# Patient Record
Sex: MALE | Race: ASIAN | NOT HISPANIC OR LATINO
[De-identification: names, ages, dates, MRNs, and addresses within clinical notes are randomized per-mention and may not be internally consistent; named-entity substitution may affect disease eponyms.]

---

## 2018-10-03 ENCOUNTER — APPOINTMENT (OUTPATIENT)
Dept: NEUROLOGY | Facility: CLINIC | Age: 63
End: 2018-10-03
Payer: COMMERCIAL

## 2018-10-03 VITALS
SYSTOLIC BLOOD PRESSURE: 128 MMHG | BODY MASS INDEX: 26.22 KG/M2 | WEIGHT: 173 LBS | HEART RATE: 88 BPM | HEIGHT: 68 IN | DIASTOLIC BLOOD PRESSURE: 66 MMHG

## 2018-10-03 DIAGNOSIS — Z78.9 OTHER SPECIFIED HEALTH STATUS: ICD-10-CM

## 2018-10-03 DIAGNOSIS — Z82.49 FAMILY HISTORY OF ISCHEMIC HEART DISEASE AND OTHER DISEASES OF THE CIRCULATORY SYSTEM: ICD-10-CM

## 2018-10-03 PROCEDURE — 99244 OFF/OP CNSLTJ NEW/EST MOD 40: CPT

## 2018-10-03 RX ORDER — AMOXICILLIN 500 MG/1
500 CAPSULE ORAL
Qty: 21 | Refills: 0 | Status: DISCONTINUED | COMMUNITY
Start: 2018-08-25

## 2018-10-03 RX ORDER — PREDNISONE 10 MG/1
10 TABLET ORAL
Qty: 50 | Refills: 0 | Status: DISCONTINUED | COMMUNITY
Start: 2018-04-24

## 2018-10-03 RX ORDER — VALACYCLOVIR 1 G/1
1 TABLET, FILM COATED ORAL
Qty: 21 | Refills: 0 | Status: DISCONTINUED | COMMUNITY
Start: 2018-08-20

## 2018-10-03 RX ORDER — ACYCLOVIR 200 MG/1
200 CAPSULE ORAL
Qty: 50 | Refills: 0 | Status: DISCONTINUED | COMMUNITY
Start: 2018-06-29

## 2018-11-02 ENCOUNTER — APPOINTMENT (OUTPATIENT)
Dept: NEUROLOGY | Facility: CLINIC | Age: 63
End: 2018-11-02
Payer: COMMERCIAL

## 2018-11-02 VITALS — HEART RATE: 103 BPM | WEIGHT: 173 LBS | BODY MASS INDEX: 26.22 KG/M2 | HEIGHT: 68 IN

## 2018-11-02 PROCEDURE — 99213 OFFICE O/P EST LOW 20 MIN: CPT

## 2018-11-06 ENCOUNTER — OTHER (OUTPATIENT)
Age: 63
End: 2018-11-06

## 2019-06-26 ENCOUNTER — APPOINTMENT (OUTPATIENT)
Dept: NEUROLOGY | Facility: CLINIC | Age: 64
End: 2019-06-26
Payer: COMMERCIAL

## 2019-06-26 VITALS
HEART RATE: 112 BPM | WEIGHT: 165 LBS | HEIGHT: 68 IN | SYSTOLIC BLOOD PRESSURE: 152 MMHG | BODY MASS INDEX: 25.01 KG/M2 | DIASTOLIC BLOOD PRESSURE: 80 MMHG

## 2019-06-26 PROCEDURE — 99215 OFFICE O/P EST HI 40 MIN: CPT

## 2019-06-26 RX ORDER — AMLODIPINE BESYLATE 5 MG/1
5 TABLET ORAL
Qty: 30 | Refills: 0 | Status: DISCONTINUED | COMMUNITY
Start: 2018-05-15 | End: 2019-06-26

## 2019-06-26 NOTE — HISTORY OF PRESENT ILLNESS
[FreeTextEntry1] : I last saw Mr. Lemus on 11/2/19.\par He is accompanied today by his son.\par He says that when he went to [place] in February he began started having pain across the lower abdomen.\par He saw a neurologist in [facility] who performed a nerve conduction study.\par He added carbamazepine 200 mg twice a day, lacosamide 50 mg twice a day, and Oxiva (L-methylfolate, methylcoabalamin, N-acetylcysteine). \par He had discontinued nortritpyline because of constipation but also continued on gabapentin.\par He came back to the [place] in the middle of April. The pain returned to the right and left chest. \par He also has pain on his back.\par He reports burning pain.\par He has pain with any contact including his comforter and his shirt.\par He does not have any significant neck pain.\par He is not aware of any infections, unusual exposures or tick bites.\par \par He has stopped the carbamazepine, lacosamide and Oxiva.\par He is taking gabapentin 300 mg BID and nortriptyline 25 mg qhs for the last 5-6 weeks.\par \par About ten days ago he developed weakness in the right foot. He cannot flex the foot.\par He has pain and weakness in the right lateral lower leg. \par He has no back pain.\par He has a burning sensation on the dorsum of the foot and in the first three toes..\par He denies having any loss of feeling in his feet.\par \par He also has burning in both feet.\par \par He denies having any bowel or bladder incontinence.

## 2019-06-26 NOTE — DATA REVIEWED
[de-identified] : MRI lumbar spine 8/16/18: multilevel DDD with herniation and stenoses\par MRI thoracic spine 8/16/18: mild stable thoracic degenerative disc disease.\par MRI cervical spine 8/16/18: multilevel degenerative spondylitic change with asymmetries and mild cord compression. \par \par blood tests 10/31/18: vitamin B12 616, RF negative, SPEP negative, Sjogren's Abs negative, HbA1C 11.4, vitamin B6 13.1k MILLY negative\par Blood tests 4/30/18: HbA1C 7.4, ESR 21, vitamin D 18.9\par \par EMG of all four extremities performed din [facility] on 3/21/19: predominantly sensory polyneuorpathy\par There were delayed sensory latencies, decreased amplitudes and decreased velocities in the nerves tested.

## 2019-06-26 NOTE — PHYSICAL EXAM
[FreeTextEntry1] : Examination:\par Constitutional: normal, no apparent distress\par Eyes: normal conjunctiva b/l, no ptosis, visual fields full\par Respiratory: no respiratory distress, normal effort, normal auscultation\par Cardiovascular: normal rate, rhythm, no murmurs\par Neck: supple, no masses\par Vascular: carotids normal\par Skin: normal color, no rashes\par Psych: normal mood, affect\par \par Neurological:\par Memory: normal memory, oriented to person, place, time\par Language intact/no aphasia\par Cranial Nerves: II-XII intact, Pupils equally round and reactive to light, ocular muscles/movements intact, no ptosis, no facial weakness, tongue protrudes normally in the midline, \par Motor: normal tone, no pronator drift, full strength in bilateral upper extremities and left lower extremity.\par 0/5 in right dorsiflexion, otherwise intact\par Coordination: Fine motor movements intact, rapid alternating movements intact, finger to nose intact bilaterally\par Sensory: intact to light touch, vibration, joint position\par DTRs: diffusely hypoactive\par Gait: right steppage gait\par

## 2019-06-26 NOTE — CONSULT LETTER
[Dear  ___] : Dear  [unfilled], [Consult Letter:] : I had the pleasure of evaluating your patient, [unfilled]. [Please see my note below.] : Please see my note below. [FreeTextEntry2] : Keily Noguera [Consult Closing:] : Thank you very much for allowing me to participate in the care of this patient.  If you have any questions, please do not hesitate to contact me. [FreeTextEntry3] : Sincerely,\par \par \par Siomara Monroe MD\par Diplomate, American Academy of Psychiatry and Neurology\par Board Certified in the Subspecialty of Clinical Neurophysiology\par Board Certified in the Subspecialty of Sleep Medicine\par Board Certified in the Subspecialty of Epilepsy\par

## 2019-07-17 ENCOUNTER — OTHER (OUTPATIENT)
Age: 64
End: 2019-07-17

## 2019-07-26 ENCOUNTER — OTHER (OUTPATIENT)
Age: 64
End: 2019-07-26

## 2019-08-01 ENCOUNTER — APPOINTMENT (OUTPATIENT)
Dept: NEUROLOGY | Facility: CLINIC | Age: 64
End: 2019-08-01
Payer: MEDICARE

## 2019-08-01 PROCEDURE — 95886 MUSC TEST DONE W/N TEST COMP: CPT

## 2019-08-01 PROCEDURE — 95911 NRV CNDJ TEST 9-10 STUDIES: CPT

## 2019-08-01 NOTE — PROCEDURE
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

## 2019-08-01 NOTE — DATA REVIEWED
[de-identified] : MRI lumbar spine 8/16/18: multilevel DDD with herniation and stenoses\par MRI thoracic spine 8/16/18: mild stable thoracic degenerative disc disease.\par MRI cervical spine 8/16/18: multilevel degenerative spondylitic change with asymmetries and mild cord compression. \par Blood tests 10/31/18: vitamin B12 616, RF negative, SPEP negative, Sjogren's Abs negative, HbA1C 11.4, vitamin B6 13.1k MILLY negative\par Blood tests 4/30/18: HbA1C 7.4, ESR 21, vitamin D 18.9\par EMG/NCV study report performed in [facility] on 3/21/19: C/W predominantly sensory polyneuropathy.\par

## 2019-08-01 NOTE — REASON FOR VISIT
[Procedure: _________] : a [unfilled] procedure visit [FreeTextEntry1] : referred by Dr. Kim for EMG/MCV studies of lower extremities evaluation of neuropathy/radiculopathy

## 2019-08-05 ENCOUNTER — APPOINTMENT (OUTPATIENT)
Dept: NEUROLOGY | Facility: CLINIC | Age: 64
End: 2019-08-05
Payer: MEDICARE

## 2019-08-05 VITALS
DIASTOLIC BLOOD PRESSURE: 80 MMHG | SYSTOLIC BLOOD PRESSURE: 140 MMHG | BODY MASS INDEX: 25.76 KG/M2 | HEART RATE: 109 BPM | HEIGHT: 68 IN | WEIGHT: 170 LBS

## 2019-08-05 PROCEDURE — 99213 OFFICE O/P EST LOW 20 MIN: CPT

## 2019-08-05 NOTE — PHYSICAL EXAM
[FreeTextEntry1] : Examination:\par Constitutional: normal, no apparent distress\par Eyes: normal conjunctiva b/l, no ptosis, visual fields full\par Respiratory: no respiratory distress, normal effort, normal auscultation\par Cardiovascular: normal rate, rhythm, no murmurs\par Neck: supple, no masses\par Vascular: carotids normal\par Skin: normal color, no rashes\par Psych: normal mood, affect\par \par Neurological:\par Memory: normal memory, oriented to person, place, time\par Language intact/no aphasia\par Cranial Nerves: II-XII intact, Pupils equally round and reactive to light, ocular muscles/movements intact, no ptosis, no facial weakness, tongue protrudes normally in the midline, \par Motor: normal tone, no pronator drift, full strength in bilateral upper extremities and left lower extremity.\par 0/5 in right dorsiflexion, otherwise intact\par Coordination: Fine motor movements intact, rapid alternating movements intact, finger to nose intact bilaterally\par Sensory: intact to light touch, vibration, joint position\par DTRs: diffusely hypoactive\par Gait: right steppage gait

## 2019-08-05 NOTE — DISCUSSION/SUMMARY
[FreeTextEntry1] : Mr. Lemus is a 63 year old who has had several months of burning pain on the trunk. this started on the right and is now present on the left as well.\par His pain is improved overall but he is having discomfort over his chest at night.\par At this time his symptoms do seem most suggestive of a small fiber neuropathy.\par \par \par The pain has now worsened in terms of distribution and he has also developed a right foot drop. \par His exam is significant for diffusely hypoactive reflexes and a 0/5 strength in right dorsiflexion.\par \par EMG confirms the presence of sensorimotor polyneuropathy, primarily of the axonal type and right peroneal neuropathy.\par -Start physical therapy. If needed we can get an AFO made.\par -We discussed the importance of blood sugar control as diabetes is the likely cause of his neuropathy. Tick borne panel and paraneoplastic panel negative\par -Painful small fiber neuropathy - Continue gabapentin 300 mg four times per day while pending approval for Lyrica 100 mg BID and then TID. We discussed the risk of mood disturbance with these medications. \par \par He will call in 2-3 weeks.\par F/U in office in 2-3 months, sooner if needed.

## 2019-08-05 NOTE — DATA REVIEWED
[de-identified] : MRI lumbar spine 8/16/18: multilevel DDD with herniation and stenoses\par MRI thoracic spine 8/16/18: mild stable thoracic degenerative disc disease.\par MRI cervical spine 8/16/18: multilevel degenerative spondylitic change with asymmetries and mild cord compression. \par \par blood tests 10/31/18: vitamin B12 616, RF negative, SPEP negative, Sjogren's Abs negative, HbA1C 11.4, vitamin B6 13.1k MILLY negative\par Blood tests 4/30/18: HbA1C 7.4, ESR 21, vitamin D 18.9\par \par EMG of all four extremities performed din [facility] on 3/21/19: predominantly sensory polyneuorpathy\par There were delayed sensory latencies, decreased amplitudes and decreased velocities in the nerves tested. \par \par MRI brain non contrast 7/15/19: unremarakable\par \par MRI lumbar spine 7/15/19: stable mutilevel DDD with herniation and stenosis. \par \par Blood tests 7/25/19: paraneoplastic Abs negative. Tick panel negative\par EMG 8/1/19:\par Right peroneal neuropathy primarily with axonal loss, in addition, there is evidence of mild to moderate peripheral sensorimotor polyneuropathy, axonal type. \par

## 2019-08-05 NOTE — HISTORY OF PRESENT ILLNESS
[FreeTextEntry1] : I last saw Mr. Lemus on 6/26/19.\par He continues to have significant pain around his chest. \par He has difficulty even wearing a shirt. \par \par He increased the gabapentin to 300 mg four times per day. \par He is not experiencing any side effects with the increased dose.\par \par He does not check his blood sugar routinely because he does not like to stick himself with a needle. \par \par He has an appointment to start physical therapy for his foot drop this week. \par He tripped once in the gym.

## 2019-10-02 ENCOUNTER — APPOINTMENT (OUTPATIENT)
Dept: NEUROLOGY | Facility: CLINIC | Age: 64
End: 2019-10-02
Payer: MEDICARE

## 2019-10-02 ENCOUNTER — RX RENEWAL (OUTPATIENT)
Age: 64
End: 2019-10-02

## 2019-10-02 VITALS
BODY MASS INDEX: 25.01 KG/M2 | SYSTOLIC BLOOD PRESSURE: 118 MMHG | HEIGHT: 68 IN | DIASTOLIC BLOOD PRESSURE: 77 MMHG | WEIGHT: 165 LBS | HEART RATE: 91 BPM

## 2019-10-02 DIAGNOSIS — M79.2 NEURALGIA AND NEURITIS, UNSPECIFIED: ICD-10-CM

## 2019-10-02 DIAGNOSIS — M21.371 FOOT DROP, RIGHT FOOT: ICD-10-CM

## 2019-10-02 PROCEDURE — 99213 OFFICE O/P EST LOW 20 MIN: CPT

## 2019-10-02 NOTE — DATA REVIEWED
[de-identified] : MRI lumbar spine 8/16/18: multilevel DDD with herniation and stenoses\par MRI thoracic spine 8/16/18: mild stable thoracic degenerative disc disease.\par MRI cervical spine 8/16/18: multilevel degenerative spondylitic change with asymmetries and mild cord compression. \par \par blood tests 10/31/18: vitamin B12 616, RF negative, SPEP negative, Sjogren's Abs negative, HbA1C 11.4, vitamin B6 13.1k MILLY negative\par Blood tests 4/30/18: HbA1C 7.4, ESR 21, vitamin D 18.9\par \par EMG of all four extremities performed din [facility] on 3/21/19: predominantly sensory polyneuorpathy\par There were delayed sensory latencies, decreased amplitudes and decreased velocities in the nerves tested. \par \par MRI brain non contrast 7/15/19: unremarakable\par \par MRI lumbar spine 7/15/19: stable mutilevel DDD with herniation and stenosis. \par \par Blood tests 7/25/19: paraneoplastic Abs negative. Tick panel negative\par EMG 8/1/19:\par Right peroneal neuropathy primarily with axonal loss, in addition, there is evidence of mild to moderate peripheral sensorimotor polyneuropathy, axonal type. \par

## 2019-10-02 NOTE — HISTORY OF PRESENT ILLNESS
[FreeTextEntry1] : Mr. Lemus was last seen on 8/5/19.\par He did not see any improvement with 200 mg/day of Lyrica. He increased the dose to 300 mg and still does not have significant improvement. He is not aware of any significant side effects including weight gain, sleepiness or mood disturbance.\par He continues to have significant pain described as burning over his chest and back.\par Even the touch of his shirt on his skin irritates him.\par He is able to sleep for about 3-4 hours at a time which is an improvement.\par \par He stopped nortriptyline because of constipation but he thinks it may have helped some of his symptoms in the past.\par \par He has been recording his blood sugar levels and they are improved overall.\par \par He stopped physical therapy for foot drop because he cannot keep his shirt on for a whole hour.\par He walks with a steppage gait. He is not tripping/falling; he is very careful. He has not gotten an AFO.

## 2019-10-02 NOTE — PHYSICAL EXAM
[FreeTextEntry1] : Examination:\par Constitutional: normal, no apparent distress\par Eyes: normal conjunctiva b/l, no ptosis, visual fields full\par Respiratory: no respiratory distress, normal effort, normal auscultation\par Cardiovascular: normal rate, rhythm, no murmurs\par Neck: supple, no masses\par Vascular: carotids normal\par Skin: normal color, no rashes\par Psych: normal mood, affect\par \par Neurological:\par Memory: normal memory, oriented to person, place, time\par Language intact/no aphasia\par Cranial Nerves: II-XII intact, Pupils equally round and reactive to light, ocular muscles/movements intact, no ptosis, no facial weakness, tongue protrudes normally in the midline, \par Motor: normal tone, no pronator drift, full strength in bilateral upper extremities and left lower extremity.\par 0/5 in right dorsiflexion, otherwise intact\par Coordination: Fine motor movements intact, rapid alternating movements intact, finger to nose intact bilaterally\par Sensory: intact to light touch, vibration, joint position\par DTRs: diffusely hypoactive\par Gait: right steppage gait. \par

## 2019-10-02 NOTE — CONSULT LETTER
[Dear  ___] : Dear  [unfilled], [Consult Letter:] : I had the pleasure of evaluating your patient, [unfilled]. [Please see my note below.] : Please see my note below. [Consult Closing:] : Thank you very much for allowing me to participate in the care of this patient.  If you have any questions, please do not hesitate to contact me. [FreeTextEntry2] : Keily Noguera [FreeTextEntry3] : Sincerely,\par \par \par Siomara Monroe MD\par Diplomate, American Academy of Psychiatry and Neurology\par Board Certified in the Subspecialty of Clinical Neurophysiology\par Board Certified in the Subspecialty of Sleep Medicine\par Board Certified in the Subspecialty of Epilepsy\par

## 2019-10-02 NOTE — DISCUSSION/SUMMARY
[FreeTextEntry1] : Mr. Lemus is a 63 year old who has had several months of burning pain on the trunk. this started on the right and is now present on the left as well.\par He continues to have significant pain, most apparent over his left chest.\par At this time his symptoms do seem most suggestive of a small fiber neuropathy.\par \par \par The pain has now worsened in terms of distribution and he has also developed a right foot drop. \par His exam is significant for diffusely hypoactive reflexes and a 0/5 strength in right dorsiflexion.\par \par EMG confirms the presence of sensorimotor polyneuropathy, primarily of the axonal type and right peroneal neuropathy.\par -Continue home exercises since he is unable to tolerate PT in the office.\par -We discussed the importance of blood sugar control as diabetes is the likely cause of his neuropathy. Continue good glucose control. Tick borne panel and paraneoplastic panel negative. Can consider checking anti-Mag antibodies in the future which were not included.\par -Painful small fiber neuropathy - Continue Lyrica. Increase dose to 100-100-200 for total of 400 mg/day. Side effects discussed.\par Restart nortriptyline 25 mg qhs with aggressive bowel regimen - increased H2O intake, fruits, vegetables and Miralax if needed.\par \par \par \par He will call in 2-3 weeks.\par F/U in office in 2-3 months, sooner if needed. \par \par

## 2020-01-31 ENCOUNTER — RX RENEWAL (OUTPATIENT)
Age: 65
End: 2020-01-31

## 2020-08-20 ENCOUNTER — APPOINTMENT (OUTPATIENT)
Dept: NEUROLOGY | Facility: CLINIC | Age: 65
End: 2020-08-20
Payer: MEDICARE

## 2020-08-20 VITALS
BODY MASS INDEX: 23.79 KG/M2 | DIASTOLIC BLOOD PRESSURE: 74 MMHG | HEART RATE: 82 BPM | WEIGHT: 157 LBS | HEIGHT: 68 IN | SYSTOLIC BLOOD PRESSURE: 124 MMHG | TEMPERATURE: 98 F

## 2020-08-20 DIAGNOSIS — R20.2 PARESTHESIA OF SKIN: ICD-10-CM

## 2020-08-20 PROCEDURE — 99213 OFFICE O/P EST LOW 20 MIN: CPT

## 2020-08-20 RX ORDER — NORTRIPTYLINE HYDROCHLORIDE 25 MG/1
25 CAPSULE ORAL
Qty: 30 | Refills: 0 | Status: DISCONTINUED | COMMUNITY
Start: 2018-10-03 | End: 2020-08-20

## 2020-08-20 RX ORDER — PREGABALIN 75 MG/1
75 CAPSULE ORAL
Qty: 90 | Refills: 1 | Status: COMPLETED | COMMUNITY
Start: 2019-07-17 | End: 2020-08-20

## 2020-08-20 RX ORDER — GABAPENTIN 300 MG/1
300 CAPSULE ORAL
Qty: 90 | Refills: 3 | Status: DISCONTINUED | COMMUNITY
Start: 2018-07-23 | End: 2020-08-20

## 2020-08-20 RX ORDER — PREGABALIN 100 MG/1
100 CAPSULE ORAL
Qty: 120 | Refills: 2 | Status: COMPLETED | COMMUNITY
Start: 2019-08-05 | End: 2020-08-20

## 2020-08-20 RX ORDER — LIDOCAINE 5% 700 MG/1
5 PATCH TOPICAL
Qty: 30 | Refills: 2 | Status: COMPLETED | COMMUNITY
Start: 2018-10-03 | End: 2020-08-20

## 2020-08-20 RX ORDER — DILTIAZEM HYDROCHLORIDE 240 MG/1
240 TABLET, EXTENDED RELEASE ORAL DAILY
Refills: 0 | Status: ACTIVE | COMMUNITY
Start: 2020-08-20

## 2020-08-20 NOTE — DATA REVIEWED
[de-identified] : MRI lumbar spine 8/16/18: multilevel DDD with herniation and stenoses\par MRI thoracic spine 8/16/18: mild stable thoracic degenerative disc disease.\par MRI cervical spine 8/16/18: multilevel degenerative spondylitic change with asymmetries and mild cord compression. \par \par blood tests 10/31/18: vitamin B12 616, RF negative, SPEP negative, Sjogren's Abs negative, HbA1C 11.4, vitamin B6 13.1k MILLY negative\par Blood tests 4/30/18: HbA1C 7.4, ESR 21, vitamin D 18.9\par \par EMG of all four extremities performed din [facility] on 3/21/19: predominantly sensory polyneuorpathy\par There were delayed sensory latencies, decreased amplitudes and decreased velocities in the nerves tested. \par \par MRI brain non contrast 7/15/19: unremarakable\par \par MRI lumbar spine 7/15/19: stable mutilevel DDD with herniation and stenosis. \par \par Blood tests 7/25/19: paraneoplastic Abs negative. Tick panel negative\par EMG 8/1/19:\par Right peroneal neuropathy primarily with axonal loss, in addition, there is evidence of mild to moderate peripheral sensorimotor polyneuropathy, axonal type. \par

## 2020-08-20 NOTE — PHYSICAL EXAM
[FreeTextEntry1] : \par Examination:\par Constitutional: normal, no apparent distress\par Eyes: normal conjunctiva b/l, no ptosis, visual fields full\par Respiratory: no respiratory distress, normal effort, normal auscultation\par Cardiovascular: normal rate, rhythm, no murmurs\par Neck: supple, no masses\par Vascular: carotids normal\par Skin: normal color, no rashes\par extremities: good pedal pulses, feet warm to touch\par Psych: normal mood, affect\par \par Neurological:\par Memory: normal memory, oriented to person, place, time\par Language intact/no aphasia\par Cranial Nerves: II-XII intact, Pupils equally round and reactive to light, ocular muscles/movements intact, no ptosis, no facial weakness, tongue protrudes normally in the midline, \par Motor: normal tone, no pronator drift, full strength in bilateral upper extremities and lower extremities\par Coordination: Fine motor movements intact, rapid alternating movements intact, finger to nose intact bilaterally\par Sensory: intact to light touch, vibration, joint position\par DTRs: diffusely hypoactive\par Gait: narrow based, steady

## 2020-08-20 NOTE — HISTORY OF PRESENT ILLNESS
[FreeTextEntry1] : I last saw Mr. Lemus on 10/2/19.\par \par His right foot drop is much improved.\par He is not having difficulty walking.\par He continues to have some loss of sensation over his right toes and medial surface of the foot.\par \par The pain over his trunk is much improved.\par He says that it is about 90% better.\par However, he has some occasional pain over his chest. He says that the pain is different than his previous pain.\par \par He stopped taking nortriptyline about two weeks ago.\par He also stopped Lyrica. He felt that it was slowing him down.\par \par About four months ago he started to walk about two miles per day.\par He has noticed tingling in both feet and in his legs while walking.\par The tingling radiates up his legs. It is alleviated with rest.\par He does have pain in his lower back and he gets numbness in his buttocks.\par He denies any color or temperature change in his feet although they burn at night.\par \par He says that his sugars are fairly well controlled on metformin.\par \par Medications previously helped with pain but not with tingling.

## 2020-08-20 NOTE — DISCUSSION/SUMMARY
[FreeTextEntry1] : Mr. Lemus is a 65 year old man with a history of   burning pain on the trunk suggestive of a small fiber neuropathy.\par He also has a history of a right foot drop secondary to a peroneal neuropathy which has essentially resolved.\par At this time he is complaining of paresthesias in his lower extremities that are painful when walking. \par \par EMG has confirmed  the presence of sensorimotor polyneuropathy, primarily of the axonal type and right peroneal neuropathy.\par \par Current symptoms are likely related to peripheral neuropathy although there may also be an element of neurogenic claudication from spinal stenosis.\par Previous medications have not helped with these symptoms.\par -Trial of duloxetine 30 mg/day - can increase as needed\par -Referral to podiatry to see if show inserts would help with symptoms.\par -Continue good control of diabetes\par \par -F/U with primary care regarding chest discomfort and possible need for cardiac workup\par -If no improvement in lower extremity symptoms with above, can investigate for vascular etiology and consider repeat MRI lumbar spine.\par \par f/u 2-3 months, sooner if needed.\par

## 2020-11-19 ENCOUNTER — APPOINTMENT (OUTPATIENT)
Dept: NEUROLOGY | Facility: CLINIC | Age: 65
End: 2020-11-19
Payer: MEDICARE

## 2020-11-19 VITALS
HEIGHT: 68 IN | HEART RATE: 80 BPM | DIASTOLIC BLOOD PRESSURE: 86 MMHG | WEIGHT: 160 LBS | SYSTOLIC BLOOD PRESSURE: 142 MMHG | TEMPERATURE: 97.5 F | BODY MASS INDEX: 24.25 KG/M2

## 2020-11-19 DIAGNOSIS — G62.9 POLYNEUROPATHY, UNSPECIFIED: ICD-10-CM

## 2020-11-19 DIAGNOSIS — E11.42 TYPE 2 DIABETES MELLITUS WITH DIABETIC POLYNEUROPATHY: ICD-10-CM

## 2020-11-19 DIAGNOSIS — G57.31 LESION OF LATERAL POPLITEAL NERVE, RIGHT LOWER LIMB: ICD-10-CM

## 2020-11-19 PROCEDURE — 99213 OFFICE O/P EST LOW 20 MIN: CPT

## 2020-11-19 RX ORDER — DULOXETINE HYDROCHLORIDE 30 MG/1
30 CAPSULE, DELAYED RELEASE PELLETS ORAL DAILY
Qty: 30 | Refills: 3 | Status: DISCONTINUED | COMMUNITY
Start: 2020-08-20 | End: 2020-11-19

## 2020-11-19 NOTE — DISCUSSION/SUMMARY
[FreeTextEntry1] : Mr. Lemus is a 65 year old man with a history of burning pain on the trunk suggestive of a small fiber neuropathy.\par He also has a history of a right foot drop secondary to a peroneal neuropathy which has mostly resolved.\par At this time he is complaining of paresthesias in his lower extremities that are painful when walking. \par \par EMG has confirmed the presence of sensorimotor polyneuropathy, primarily of the axonal type and right peroneal neuropathy.\par \par Current symptoms are likely related to peripheral neuropathy as well as radiculopathy (last MRI l-spine July 2019)\par He denies back pain.\par -He is not interested in physical therapy at this time.\par Previous medications have not helped with these symptoms.\par -Previous trials include nortriptyline, gabapentin, Lyrica, Cymbalta.\par -Retrial of gabapentin. Will start 300 mg qhs x 2 weeks and then increase to 300 mg BID.\par -Continue good control of diabetes\par \par Diarrhea\par -Recommend f/u with PCP for physical and if needed referral to GI.\par \par f/u 3-4 months, sooner if needed.\par

## 2020-11-19 NOTE — DATA REVIEWED
[de-identified] : MRI lumbar spine 8/16/18: multilevel DDD with herniation and stenoses\par MRI thoracic spine 8/16/18: mild stable thoracic degenerative disc disease.\par MRI cervical spine 8/16/18: multilevel degenerative spondylitic change with asymmetries and mild cord compression. \par \par blood tests 10/31/18: vitamin B12 616, RF negative, SPEP negative, Sjogren's Abs negative, HbA1C 11.4, vitamin B6 13.1k MILLY negative\par Blood tests 4/30/18: HbA1C 7.4, ESR 21, vitamin D 18.9\par \par EMG of all four extremities performed din [facility] on 3/21/19: predominantly sensory polyneuorpathy\par There were delayed sensory latencies, decreased amplitudes and decreased velocities in the nerves tested. \par \par MRI brain non contrast 7/15/19: unremarakable\par \par MRI lumbar spine 7/15/19: stable mutilevel DDD with herniation and stenosis. \par \par Blood tests 7/25/19: paraneoplastic Abs negative. Tick panel negative\par EMG 8/1/19:\par Right peroneal neuropathy primarily with axonal loss, in addition, there is evidence of mild to moderate peripheral sensorimotor polyneuropathy, axonal type. \par

## 2020-11-19 NOTE — PHYSICAL EXAM
[FreeTextEntry1] : \par Examination:\par Constitutional: normal, no apparent distress\par Eyes: normal conjunctiva b/l, no ptosis, visual fields full\par Respiratory: no respiratory distress, normal effort, normal auscultation\par Cardiovascular: normal rate, rhythm, no murmurs\par Neck: supple, no masses\par Vascular: carotids normal\par Skin: normal color, no rashes\par extremities: good pedal pulses, feet warm to touch\par Psych: normal mood, affect\par \par Neurological:\par Memory: normal memory, oriented to person, place, time\par Language intact/no aphasia\par Cranial Nerves: II-XII intact, Pupils equally round and reactive to light, ocular muscles/movements intact, no ptosis, no facial weakness, tongue protrudes normally in the midline, \par Motor: normal tone, no pronator drift, full strength in bilateral upper extremities and lower extremities\par Coordination: Fine motor movements intact, rapid alternating movements intact, finger to nose intact bilaterally\par Sensory: intact to light touch, vibration, joint position\par DTRs: diffusely hypoactive\par Gait: narrow based, steady. \par

## 2020-11-19 NOTE — HISTORY OF PRESENT ILLNESS
[FreeTextEntry1] : I last saw Mr. Lemus on 8/20/20.\par He says that he is more or less the same.\par Over the last 6-8 weeks he has developed GI upset.\par He says that ~ 3-4 times per week he has a solid bowel movement in the AM and diarrhea in the evening.\par He has been watching his diet. He usually eats at 10 AM and 7 PM.\par He denies blood in the stool. Sometimes he notices mucous in his stool.\par He had a colonoscopy ~ 4 years ago.\par He has not had any recent travel.\par \par He only took duloxetine for a few days. He felt lethargic and strange and he discontinued it.\par \par He has paresthesias on the toes.\par He has numbness in his left thigh when walking.\par He denies swelling or temperature changes in his feet.\par He did not see podiatry.\par \par His foot drop is much improved.\par The pain in his trunk has resolved.\par

## 2021-03-16 ENCOUNTER — APPOINTMENT (OUTPATIENT)
Dept: NEUROLOGY | Facility: CLINIC | Age: 66
End: 2021-03-16

## 2021-11-28 ENCOUNTER — TRANSCRIPTION ENCOUNTER (OUTPATIENT)
Age: 66
End: 2021-11-28

## 2021-11-28 ENCOUNTER — INPATIENT (INPATIENT)
Facility: HOSPITAL | Age: 66
LOS: 1 days | Discharge: HOME | DRG: 660 | End: 2021-11-30
Attending: FAMILY MEDICINE | Admitting: STUDENT IN AN ORGANIZED HEALTH CARE EDUCATION/TRAINING PROGRAM
Payer: MEDICARE

## 2021-11-28 ENCOUNTER — RESULT REVIEW (OUTPATIENT)
Age: 66
End: 2021-11-28

## 2021-11-28 VITALS — WEIGHT: 160.06 LBS | HEIGHT: 68 IN

## 2021-11-28 DIAGNOSIS — N20.0 CALCULUS OF KIDNEY: ICD-10-CM

## 2021-11-28 LAB
ABO RH CONFIRMATION: SIGNIFICANT CHANGE UP
ALBUMIN SERPL ELPH-MCNC: 3.7 G/DL — SIGNIFICANT CHANGE UP (ref 3.3–5)
ALP SERPL-CCNC: 99 U/L — SIGNIFICANT CHANGE UP (ref 40–120)
ALT FLD-CCNC: 25 U/L — SIGNIFICANT CHANGE UP (ref 12–78)
ANION GAP SERPL CALC-SCNC: 9 MMOL/L — SIGNIFICANT CHANGE UP (ref 5–17)
APPEARANCE UR: CLEAR — SIGNIFICANT CHANGE UP
AST SERPL-CCNC: 14 U/L — LOW (ref 15–37)
BASOPHILS # BLD AUTO: 0.02 K/UL — SIGNIFICANT CHANGE UP (ref 0–0.2)
BASOPHILS NFR BLD AUTO: 0.1 % — SIGNIFICANT CHANGE UP (ref 0–2)
BILIRUB SERPL-MCNC: 0.3 MG/DL — SIGNIFICANT CHANGE UP (ref 0.2–1.2)
BILIRUB UR-MCNC: NEGATIVE — SIGNIFICANT CHANGE UP
BUN SERPL-MCNC: 16 MG/DL — SIGNIFICANT CHANGE UP (ref 7–23)
CALCIUM SERPL-MCNC: 9.6 MG/DL — SIGNIFICANT CHANGE UP (ref 8.5–10.1)
CHLORIDE SERPL-SCNC: 103 MMOL/L — SIGNIFICANT CHANGE UP (ref 96–108)
CO2 SERPL-SCNC: 25 MMOL/L — SIGNIFICANT CHANGE UP (ref 22–31)
COLOR SPEC: YELLOW — SIGNIFICANT CHANGE UP
CREAT SERPL-MCNC: 1.04 MG/DL — SIGNIFICANT CHANGE UP (ref 0.5–1.3)
DIFF PNL FLD: ABNORMAL
EOSINOPHIL # BLD AUTO: 0 K/UL — SIGNIFICANT CHANGE UP (ref 0–0.5)
EOSINOPHIL NFR BLD AUTO: 0 % — SIGNIFICANT CHANGE UP (ref 0–6)
GLUCOSE SERPL-MCNC: 159 MG/DL — HIGH (ref 70–99)
GLUCOSE UR QL: 50 MG/DL
HCT VFR BLD CALC: 41.9 % — SIGNIFICANT CHANGE UP (ref 39–50)
HGB BLD-MCNC: 13.8 G/DL — SIGNIFICANT CHANGE UP (ref 13–17)
IMM GRANULOCYTES NFR BLD AUTO: 0.9 % — SIGNIFICANT CHANGE UP (ref 0–1.5)
KETONES UR-MCNC: ABNORMAL
LACTATE SERPL-SCNC: 3.1 MMOL/L — HIGH (ref 0.7–2)
LACTATE SERPL-SCNC: 3.3 MMOL/L — HIGH (ref 0.7–2)
LEUKOCYTE ESTERASE UR-ACNC: ABNORMAL
LYMPHOCYTES # BLD AUTO: 1.65 K/UL — SIGNIFICANT CHANGE UP (ref 1–3.3)
LYMPHOCYTES # BLD AUTO: 9.9 % — LOW (ref 13–44)
MCHC RBC-ENTMCNC: 28.6 PG — SIGNIFICANT CHANGE UP (ref 27–34)
MCHC RBC-ENTMCNC: 32.9 GM/DL — SIGNIFICANT CHANGE UP (ref 32–36)
MCV RBC AUTO: 86.7 FL — SIGNIFICANT CHANGE UP (ref 80–100)
MONOCYTES # BLD AUTO: 0.57 K/UL — SIGNIFICANT CHANGE UP (ref 0–0.9)
MONOCYTES NFR BLD AUTO: 3.4 % — SIGNIFICANT CHANGE UP (ref 2–14)
NEUTROPHILS # BLD AUTO: 14.32 K/UL — HIGH (ref 1.8–7.4)
NEUTROPHILS NFR BLD AUTO: 85.7 % — HIGH (ref 43–77)
NITRITE UR-MCNC: NEGATIVE — SIGNIFICANT CHANGE UP
PH UR: 5 — SIGNIFICANT CHANGE UP (ref 5–8)
PLATELET # BLD AUTO: 337 K/UL — SIGNIFICANT CHANGE UP (ref 150–400)
POTASSIUM SERPL-MCNC: 4 MMOL/L — SIGNIFICANT CHANGE UP (ref 3.5–5.3)
POTASSIUM SERPL-SCNC: 4 MMOL/L — SIGNIFICANT CHANGE UP (ref 3.5–5.3)
PROT SERPL-MCNC: 8.5 GM/DL — HIGH (ref 6–8.3)
PROT UR-MCNC: 100 MG/DL
RAPID RVP RESULT: SIGNIFICANT CHANGE UP
RBC # BLD: 4.83 M/UL — SIGNIFICANT CHANGE UP (ref 4.2–5.8)
RBC # FLD: 14.2 % — SIGNIFICANT CHANGE UP (ref 10.3–14.5)
SARS-COV-2 RNA SPEC QL NAA+PROBE: SIGNIFICANT CHANGE UP
SODIUM SERPL-SCNC: 137 MMOL/L — SIGNIFICANT CHANGE UP (ref 135–145)
SP GR SPEC: 1.02 — SIGNIFICANT CHANGE UP (ref 1.01–1.02)
UROBILINOGEN FLD QL: NEGATIVE MG/DL — SIGNIFICANT CHANGE UP
WBC # BLD: 16.71 K/UL — HIGH (ref 3.8–10.5)
WBC # FLD AUTO: 16.71 K/UL — HIGH (ref 3.8–10.5)

## 2021-11-28 PROCEDURE — 88300 SURGICAL PATH GROSS: CPT

## 2021-11-28 PROCEDURE — 80048 BASIC METABOLIC PNL TOTAL CA: CPT

## 2021-11-28 PROCEDURE — 83036 HEMOGLOBIN GLYCOSYLATED A1C: CPT

## 2021-11-28 PROCEDURE — 99285 EMERGENCY DEPT VISIT HI MDM: CPT | Mod: CS

## 2021-11-28 PROCEDURE — 99223 1ST HOSP IP/OBS HIGH 75: CPT

## 2021-11-28 PROCEDURE — 80053 COMPREHEN METABOLIC PANEL: CPT

## 2021-11-28 PROCEDURE — 85610 PROTHROMBIN TIME: CPT

## 2021-11-28 PROCEDURE — 74177 CT ABD & PELVIS W/CONTRAST: CPT | Mod: 26,MA

## 2021-11-28 PROCEDURE — 82365 CALCULUS SPECTROSCOPY: CPT

## 2021-11-28 PROCEDURE — 86769 SARS-COV-2 COVID-19 ANTIBODY: CPT

## 2021-11-28 PROCEDURE — 82962 GLUCOSE BLOOD TEST: CPT

## 2021-11-28 PROCEDURE — 71046 X-RAY EXAM CHEST 2 VIEWS: CPT | Mod: 26

## 2021-11-28 PROCEDURE — 52332 CYSTOSCOPY AND TREATMENT: CPT | Mod: LT

## 2021-11-28 PROCEDURE — C2625: CPT

## 2021-11-28 PROCEDURE — 85730 THROMBOPLASTIN TIME PARTIAL: CPT

## 2021-11-28 PROCEDURE — 86803 HEPATITIS C AB TEST: CPT

## 2021-11-28 PROCEDURE — 85025 COMPLETE CBC W/AUTO DIFF WBC: CPT

## 2021-11-28 PROCEDURE — 83605 ASSAY OF LACTIC ACID: CPT

## 2021-11-28 PROCEDURE — 93005 ELECTROCARDIOGRAM TRACING: CPT

## 2021-11-28 PROCEDURE — 36415 COLL VENOUS BLD VENIPUNCTURE: CPT

## 2021-11-28 PROCEDURE — C9399: CPT

## 2021-11-28 PROCEDURE — 76000 FLUOROSCOPY <1 HR PHYS/QHP: CPT

## 2021-11-28 PROCEDURE — 88300 SURGICAL PATH GROSS: CPT | Mod: 26

## 2021-11-28 RX ORDER — DEXTROSE 50 % IN WATER 50 %
15 SYRINGE (ML) INTRAVENOUS ONCE
Refills: 0 | Status: DISCONTINUED | OUTPATIENT
Start: 2021-11-28 | End: 2021-11-30

## 2021-11-28 RX ORDER — CEFTRIAXONE 500 MG/1
1000 INJECTION, POWDER, FOR SOLUTION INTRAMUSCULAR; INTRAVENOUS ONCE
Refills: 0 | Status: DISCONTINUED | OUTPATIENT
Start: 2021-11-28 | End: 2021-11-28

## 2021-11-28 RX ORDER — SODIUM CHLORIDE 9 MG/ML
1000 INJECTION, SOLUTION INTRAVENOUS
Refills: 0 | Status: DISCONTINUED | OUTPATIENT
Start: 2021-11-28 | End: 2021-11-30

## 2021-11-28 RX ORDER — TAMSULOSIN HYDROCHLORIDE 0.4 MG/1
0.4 CAPSULE ORAL AT BEDTIME
Refills: 0 | Status: DISCONTINUED | OUTPATIENT
Start: 2021-11-28 | End: 2021-11-30

## 2021-11-28 RX ORDER — ACETAMINOPHEN 500 MG
650 TABLET ORAL EVERY 6 HOURS
Refills: 0 | Status: DISCONTINUED | OUTPATIENT
Start: 2021-11-28 | End: 2021-11-30

## 2021-11-28 RX ORDER — SODIUM CHLORIDE 9 MG/ML
2160 INJECTION INTRAMUSCULAR; INTRAVENOUS; SUBCUTANEOUS ONCE
Refills: 0 | Status: COMPLETED | OUTPATIENT
Start: 2021-11-28 | End: 2021-11-28

## 2021-11-28 RX ORDER — PHENAZOPYRIDINE HCL 100 MG
200 TABLET ORAL THREE TIMES A DAY
Refills: 0 | Status: DISCONTINUED | OUTPATIENT
Start: 2021-11-28 | End: 2021-11-30

## 2021-11-28 RX ORDER — CEFTRIAXONE 500 MG/1
1000 INJECTION, POWDER, FOR SOLUTION INTRAMUSCULAR; INTRAVENOUS ONCE
Refills: 0 | Status: COMPLETED | OUTPATIENT
Start: 2021-11-28 | End: 2021-11-28

## 2021-11-28 RX ORDER — OXYCODONE HYDROCHLORIDE 5 MG/1
5 TABLET ORAL ONCE
Refills: 0 | Status: DISCONTINUED | OUTPATIENT
Start: 2021-11-28 | End: 2021-11-28

## 2021-11-28 RX ORDER — ONDANSETRON 8 MG/1
4 TABLET, FILM COATED ORAL EVERY 6 HOURS
Refills: 0 | Status: DISCONTINUED | OUTPATIENT
Start: 2021-11-28 | End: 2021-11-30

## 2021-11-28 RX ORDER — CEFTRIAXONE 500 MG/1
1000 INJECTION, POWDER, FOR SOLUTION INTRAMUSCULAR; INTRAVENOUS EVERY 24 HOURS
Refills: 0 | Status: DISCONTINUED | OUTPATIENT
Start: 2021-11-28 | End: 2021-11-30

## 2021-11-28 RX ORDER — PHENAZOPYRIDINE HCL 100 MG
200 TABLET ORAL ONCE
Refills: 0 | Status: COMPLETED | OUTPATIENT
Start: 2021-11-28 | End: 2021-11-28

## 2021-11-28 RX ORDER — OXYBUTYNIN CHLORIDE 5 MG
5 TABLET ORAL ONCE
Refills: 0 | Status: COMPLETED | OUTPATIENT
Start: 2021-11-28 | End: 2021-11-28

## 2021-11-28 RX ORDER — DEXTROSE 50 % IN WATER 50 %
12.5 SYRINGE (ML) INTRAVENOUS ONCE
Refills: 0 | Status: DISCONTINUED | OUTPATIENT
Start: 2021-11-28 | End: 2021-11-30

## 2021-11-28 RX ORDER — ONDANSETRON 8 MG/1
4 TABLET, FILM COATED ORAL ONCE
Refills: 0 | Status: DISCONTINUED | OUTPATIENT
Start: 2021-11-28 | End: 2021-11-28

## 2021-11-28 RX ORDER — SODIUM CHLORIDE 9 MG/ML
1000 INJECTION, SOLUTION INTRAVENOUS
Refills: 0 | Status: DISCONTINUED | OUTPATIENT
Start: 2021-11-28 | End: 2021-11-28

## 2021-11-28 RX ORDER — GLUCAGON INJECTION, SOLUTION 0.5 MG/.1ML
1 INJECTION, SOLUTION SUBCUTANEOUS ONCE
Refills: 0 | Status: DISCONTINUED | OUTPATIENT
Start: 2021-11-28 | End: 2021-11-30

## 2021-11-28 RX ORDER — HYDROMORPHONE HYDROCHLORIDE 2 MG/ML
0.5 INJECTION INTRAMUSCULAR; INTRAVENOUS; SUBCUTANEOUS
Refills: 0 | Status: DISCONTINUED | OUTPATIENT
Start: 2021-11-28 | End: 2021-11-28

## 2021-11-28 RX ORDER — INSULIN LISPRO 100/ML
VIAL (ML) SUBCUTANEOUS AT BEDTIME
Refills: 0 | Status: DISCONTINUED | OUTPATIENT
Start: 2021-11-28 | End: 2021-11-30

## 2021-11-28 RX ORDER — DEXTROSE 50 % IN WATER 50 %
25 SYRINGE (ML) INTRAVENOUS ONCE
Refills: 0 | Status: DISCONTINUED | OUTPATIENT
Start: 2021-11-28 | End: 2021-11-30

## 2021-11-28 RX ORDER — OXYBUTYNIN CHLORIDE 5 MG
5 TABLET ORAL THREE TIMES A DAY
Refills: 0 | Status: DISCONTINUED | OUTPATIENT
Start: 2021-11-28 | End: 2021-11-30

## 2021-11-28 RX ORDER — DILTIAZEM HCL 120 MG
240 CAPSULE, EXT RELEASE 24 HR ORAL DAILY
Refills: 0 | Status: DISCONTINUED | OUTPATIENT
Start: 2021-11-28 | End: 2021-11-30

## 2021-11-28 RX ORDER — INFLUENZA VIRUS VACCINE 15; 15; 15; 15 UG/.5ML; UG/.5ML; UG/.5ML; UG/.5ML
0.7 SUSPENSION INTRAMUSCULAR ONCE
Refills: 0 | Status: COMPLETED | OUTPATIENT
Start: 2021-11-28 | End: 2021-11-28

## 2021-11-28 RX ORDER — INSULIN LISPRO 100/ML
VIAL (ML) SUBCUTANEOUS
Refills: 0 | Status: DISCONTINUED | OUTPATIENT
Start: 2021-11-28 | End: 2021-11-30

## 2021-11-28 RX ORDER — FENTANYL CITRATE 50 UG/ML
50 INJECTION INTRAVENOUS
Refills: 0 | Status: DISCONTINUED | OUTPATIENT
Start: 2021-11-28 | End: 2021-11-28

## 2021-11-28 RX ORDER — SODIUM CHLORIDE 9 MG/ML
1000 INJECTION INTRAMUSCULAR; INTRAVENOUS; SUBCUTANEOUS
Refills: 0 | Status: DISCONTINUED | OUTPATIENT
Start: 2021-11-28 | End: 2021-11-30

## 2021-11-28 RX ADMIN — CEFTRIAXONE 1000 MILLIGRAM(S): 500 INJECTION, POWDER, FOR SOLUTION INTRAMUSCULAR; INTRAVENOUS at 15:37

## 2021-11-28 RX ADMIN — Medication 5 MILLIGRAM(S): at 21:09

## 2021-11-28 RX ADMIN — SODIUM CHLORIDE 2160 MILLILITER(S): 9 INJECTION INTRAMUSCULAR; INTRAVENOUS; SUBCUTANEOUS at 16:00

## 2021-11-28 RX ADMIN — Medication 200 MILLIGRAM(S): at 21:09

## 2021-11-28 RX ADMIN — SODIUM CHLORIDE 75 MILLILITER(S): 9 INJECTION, SOLUTION INTRAVENOUS at 21:07

## 2021-11-28 RX ADMIN — SODIUM CHLORIDE 864 MILLILITER(S): 9 INJECTION INTRAMUSCULAR; INTRAVENOUS; SUBCUTANEOUS at 15:37

## 2021-11-28 RX ADMIN — SODIUM CHLORIDE 100 MILLILITER(S): 9 INJECTION INTRAMUSCULAR; INTRAVENOUS; SUBCUTANEOUS at 22:26

## 2021-11-28 RX ADMIN — ONDANSETRON 4 MILLIGRAM(S): 8 TABLET, FILM COATED ORAL at 22:27

## 2021-11-28 NOTE — PATIENT PROFILE ADULT - NSTRANSFERBELONGINGSDISPO_GEN_A_NUR
with patient Belongings sent to security preop-picked up when patient arrived to pediatrics/given to security

## 2021-11-28 NOTE — ED STATDOCS - NS_ ATTENDINGSCRIBEDETAILS _ED_A_ED_FT
I, Martin Beltran MD,  performed the initial face to face bedside interview with this patient regarding history of present illness, review of symptoms and relevant past medical, social and family history.  I completed an independent physical examination.  I was the initial provider who evaluated this patient.  The history, relevant review of systems, past medical and surgical history, medical decision making, and physical examination was documented by the scribe in my presence and I attest to the accuracy of the documentation.

## 2021-11-28 NOTE — ED STATDOCS - OBJECTIVE STATEMENT
67 y/o male with a PMHx of DM, HTN, UTI presents to the ED c/o urinary frequency and right flank pain since last night. Pt was seen at urgent care PTA and was sent to the ED for evaluation. Denies fevers. NKDA. Nonsmoker. No EtOH use. No other complaints at this time.

## 2021-11-28 NOTE — ED STATDOCS - CLINICAL SUMMARY MEDICAL DECISION MAKING FREE TEXT BOX
67 y/o male hx of DM, HTN presents with right flank pain and urinary frequency. Exam with right CVA tenderness. Will obtain labs, CT, reassess. 67 y/o male hx of DM, HTN presents with right flank pain and urinary frequency. Exam with right CVA tenderness. concern for stone vs pyelo Will obtain labs, CT, reassess.

## 2021-11-28 NOTE — ED STATDOCS - CARE PLAN
1 Principal Discharge DX:	Urinary tract obstruction by kidney stone  Secondary Diagnosis:	Sepsis  Secondary Diagnosis:	Acute UTI

## 2021-11-28 NOTE — ED STATDOCS - PROGRESS NOTE DETAILS
IVF running.  Lactate now 4.2 patient only received 500cc so far. Pt. with 2mm stone at right UVJ.  Obstructive uropathy.  Urine unremarkable.  Urology to consult and admission.  Nicolette Mills PA-C Lactate 3.5. Sepsis labs/ IVF ordered.  Nicolette Mills PA-C Pt. is a 66 year old male Hx DM, HTN, recent UTI s/p back surgery treated with OP ABX, presents with right flank pain, urinary frequency.  Pt. states pain since last night.  Neg. F/C/S.  Pt. denies vomiting.  Neg. hematuria. PMD urologist non local. . me Dr. Kim in department to evaluate patient.  Nicolette Mills PA-C Dr. Kim in department to evaluate patient.   Will take to OR tonight.  COVID swab 1.5 hours until complete as per lab.   Nicolette Mills PA-C

## 2021-11-28 NOTE — BRIEF OPERATIVE NOTE - NSICDXBRIEFPOSTOP_GEN_ALL_CORE_FT
POST-OP DIAGNOSIS:  Ureteral stone 28-Nov-2021 20:38:29  Fidel Kim  Bladder stone 28-Nov-2021 20:38:34  Fidel Kim

## 2021-11-28 NOTE — H&P ADULT - CONVERSATION DETAILS
Patient states HCP is his wife Charito. Patient denies having any advance directives for resuscitation status

## 2021-11-28 NOTE — ED STATDOCS - NS ED ROS FT
Constitutional: No fever or chills  Eyes: No visual changes  HEENT: No throat pain  CV: No chest pain  Resp: No SOB no cough  GI: No abd pain, nausea or vomiting  : +right flank pain, +urinary frequency    MSK: No musculoskeletal pain  Skin: No rash  Neuro: No headache

## 2021-11-28 NOTE — H&P ADULT - ASSESSMENT
67 y/o M w/ PMH of DM2, HTN, recent L2-L5 surgery 1 month ago at [place], p/w increased urinary frequency and R flank pain that started last night    *R sided obstructive uropathy w/ renal stones s/p stent placement   -Pain control   -CT also reports prostatomegaly + hypodensities -> f/u   -Lactic acidosis trending down, will recheck s/p IVF  -Will c/w ceftriaxone given leukocytosis     *DM2  -Humalog ISS    *HTN  -C/w home meds     *Recent lumbar surgery  -F/u outpatient w/ ortho    *DVT ppx  -SCDs

## 2021-11-28 NOTE — ED STATDOCS - PHYSICAL EXAMINATION
Constitutional: NAD AAOx3  Eyes: PERRLA EOMI  Head: Normocephalic atraumatic  Mouth: MMM  Cardiac: regular rate   Resp: Lungs CTAB  GI: Abd s/nt/nd  : Right CVA tenderness   Neuro: CN2-12 intact  Skin: No rashes Constitutional: mild distress AAOx3  Eyes: PERRLA EOMI  Head: Normocephalic atraumatic  Mouth: MMM  Cardiac: regular rate   Resp: Lungs CTAB  GI: Abd s/nt/nd  : Right CVA tenderness   Neuro: CN2-12 intact  Skin: No rashes

## 2021-11-28 NOTE — ED ADULT NURSE NOTE - OBJECTIVE STATEMENT
Pt c/o right flank pain started yesterday and urinary retention. Pt denies fevers, hematuria,  chills, N/V/D. Pt denies Cp, SOB.

## 2021-11-28 NOTE — ED ADULT TRIAGE NOTE - CHIEF COMPLAINT QUOTE
sent in from urgent care for urinary retention and right flank pain started yesterday, denies fever or hematuria HX: HTN, DM

## 2021-11-28 NOTE — H&P ADULT - HISTORY OF PRESENT ILLNESS
67 y/o M w/ PMH of DM2, HTN, recent L2-L5 surgery 1 month ago at [place], p/w increased urinary frequency and R flank pain that started last night. Patient went to urgent care center and was told he had blood in urine and referred to ED. In ED, patient found to have R sided obstructive uropathy w/ renal stones, and is s/p stent by Dr. Kim. Patient states he currently feels comfortable. Denies fever, chills, nausea, vomiting, abdominal pain, CP, SOB, cough, runny nose, sore throat       PSH: Lumbar surgery     Social Hx: Denies x 3     Family Hx: Mother - CHF

## 2021-11-29 LAB
A1C WITH ESTIMATED AVERAGE GLUCOSE RESULT: 6.2 % — HIGH (ref 4–5.6)
ALBUMIN SERPL ELPH-MCNC: 3.1 G/DL — LOW (ref 3.3–5)
ALP SERPL-CCNC: 79 U/L — SIGNIFICANT CHANGE UP (ref 40–120)
ALT FLD-CCNC: 19 U/L — SIGNIFICANT CHANGE UP (ref 12–78)
ANION GAP SERPL CALC-SCNC: 5 MMOL/L — SIGNIFICANT CHANGE UP (ref 5–17)
APTT BLD: 30 SEC — SIGNIFICANT CHANGE UP (ref 27.5–35.5)
AST SERPL-CCNC: 17 U/L — SIGNIFICANT CHANGE UP (ref 15–37)
BASOPHILS # BLD AUTO: 0.04 K/UL — SIGNIFICANT CHANGE UP (ref 0–0.2)
BASOPHILS NFR BLD AUTO: 0.2 % — SIGNIFICANT CHANGE UP (ref 0–2)
BILIRUB SERPL-MCNC: 0.4 MG/DL — SIGNIFICANT CHANGE UP (ref 0.2–1.2)
BUN SERPL-MCNC: 11 MG/DL — SIGNIFICANT CHANGE UP (ref 7–23)
CALCIUM SERPL-MCNC: 9 MG/DL — SIGNIFICANT CHANGE UP (ref 8.5–10.1)
CHLORIDE SERPL-SCNC: 110 MMOL/L — HIGH (ref 96–108)
CO2 SERPL-SCNC: 28 MMOL/L — SIGNIFICANT CHANGE UP (ref 22–31)
COVID-19 NUCLEOCAPSID GAM AB INTERP: NEGATIVE — SIGNIFICANT CHANGE UP
COVID-19 NUCLEOCAPSID TOTAL GAM ANTIBODY RESULT: 0.09 INDEX — SIGNIFICANT CHANGE UP
COVID-19 SPIKE DOMAIN AB INTERP: POSITIVE
COVID-19 SPIKE DOMAIN ANTIBODY RESULT: >250 U/ML — HIGH
CREAT SERPL-MCNC: 0.77 MG/DL — SIGNIFICANT CHANGE UP (ref 0.5–1.3)
CULTURE RESULTS: SIGNIFICANT CHANGE UP
EOSINOPHIL # BLD AUTO: 0.05 K/UL — SIGNIFICANT CHANGE UP (ref 0–0.5)
EOSINOPHIL NFR BLD AUTO: 0.3 % — SIGNIFICANT CHANGE UP (ref 0–6)
ESTIMATED AVERAGE GLUCOSE: 131 MG/DL — HIGH (ref 68–114)
GLUCOSE BLDC GLUCOMTR-MCNC: 123 MG/DL — HIGH (ref 70–99)
GLUCOSE SERPL-MCNC: 116 MG/DL — HIGH (ref 70–99)
HCT VFR BLD CALC: 36.6 % — LOW (ref 39–50)
HCV AB S/CO SERPL IA: 0.08 S/CO — SIGNIFICANT CHANGE UP (ref 0–0.99)
HCV AB SERPL-IMP: SIGNIFICANT CHANGE UP
HGB BLD-MCNC: 11.8 G/DL — LOW (ref 13–17)
IMM GRANULOCYTES NFR BLD AUTO: 0.6 % — SIGNIFICANT CHANGE UP (ref 0–1.5)
INR BLD: 1.16 RATIO — SIGNIFICANT CHANGE UP (ref 0.88–1.16)
LACTATE SERPL-SCNC: 2 MMOL/L — SIGNIFICANT CHANGE UP (ref 0.7–2)
LACTATE SERPL-SCNC: 2.5 MMOL/L — HIGH (ref 0.7–2)
LYMPHOCYTES # BLD AUTO: 24.6 % — SIGNIFICANT CHANGE UP (ref 13–44)
LYMPHOCYTES # BLD AUTO: 3.96 K/UL — HIGH (ref 1–3.3)
MCHC RBC-ENTMCNC: 28 PG — SIGNIFICANT CHANGE UP (ref 27–34)
MCHC RBC-ENTMCNC: 32.2 GM/DL — SIGNIFICANT CHANGE UP (ref 32–36)
MCV RBC AUTO: 86.9 FL — SIGNIFICANT CHANGE UP (ref 80–100)
MONOCYTES # BLD AUTO: 1.24 K/UL — HIGH (ref 0–0.9)
MONOCYTES NFR BLD AUTO: 7.7 % — SIGNIFICANT CHANGE UP (ref 2–14)
NEUTROPHILS # BLD AUTO: 10.7 K/UL — HIGH (ref 1.8–7.4)
NEUTROPHILS NFR BLD AUTO: 66.6 % — SIGNIFICANT CHANGE UP (ref 43–77)
PLATELET # BLD AUTO: 301 K/UL — SIGNIFICANT CHANGE UP (ref 150–400)
POTASSIUM SERPL-MCNC: 3.7 MMOL/L — SIGNIFICANT CHANGE UP (ref 3.5–5.3)
POTASSIUM SERPL-SCNC: 3.7 MMOL/L — SIGNIFICANT CHANGE UP (ref 3.5–5.3)
PROT SERPL-MCNC: 7 GM/DL — SIGNIFICANT CHANGE UP (ref 6–8.3)
PROTHROM AB SERPL-ACNC: 13.4 SEC — SIGNIFICANT CHANGE UP (ref 10.6–13.6)
RBC # BLD: 4.21 M/UL — SIGNIFICANT CHANGE UP (ref 4.2–5.8)
RBC # FLD: 14.3 % — SIGNIFICANT CHANGE UP (ref 10.3–14.5)
SARS-COV-2 IGG+IGM SERPL QL IA: 0.09 INDEX — SIGNIFICANT CHANGE UP
SARS-COV-2 IGG+IGM SERPL QL IA: >250 U/ML — HIGH
SARS-COV-2 IGG+IGM SERPL QL IA: NEGATIVE — SIGNIFICANT CHANGE UP
SARS-COV-2 IGG+IGM SERPL QL IA: POSITIVE
SODIUM SERPL-SCNC: 143 MMOL/L — SIGNIFICANT CHANGE UP (ref 135–145)
SPECIMEN SOURCE: SIGNIFICANT CHANGE UP
WBC # BLD: 16.09 K/UL — HIGH (ref 3.8–10.5)
WBC # FLD AUTO: 16.09 K/UL — HIGH (ref 3.8–10.5)

## 2021-11-29 PROCEDURE — 93010 ELECTROCARDIOGRAM REPORT: CPT

## 2021-11-29 PROCEDURE — 99232 SBSQ HOSP IP/OBS MODERATE 35: CPT

## 2021-11-29 RX ADMIN — Medication 1: at 18:25

## 2021-11-29 RX ADMIN — CEFTRIAXONE 1000 MILLIGRAM(S): 500 INJECTION, POWDER, FOR SOLUTION INTRAMUSCULAR; INTRAVENOUS at 15:18

## 2021-11-29 RX ADMIN — TAMSULOSIN HYDROCHLORIDE 0.4 MILLIGRAM(S): 0.4 CAPSULE ORAL at 22:37

## 2021-11-29 RX ADMIN — SODIUM CHLORIDE 100 MILLILITER(S): 9 INJECTION INTRAMUSCULAR; INTRAVENOUS; SUBCUTANEOUS at 21:05

## 2021-11-29 RX ADMIN — Medication 200 MILLIGRAM(S): at 15:52

## 2021-11-29 RX ADMIN — Medication 5 MILLIGRAM(S): at 15:52

## 2021-11-29 RX ADMIN — TAMSULOSIN HYDROCHLORIDE 0.4 MILLIGRAM(S): 0.4 CAPSULE ORAL at 06:02

## 2021-11-29 RX ADMIN — SODIUM CHLORIDE 100 MILLILITER(S): 9 INJECTION INTRAMUSCULAR; INTRAVENOUS; SUBCUTANEOUS at 06:02

## 2021-11-29 RX ADMIN — Medication 240 MILLIGRAM(S): at 10:15

## 2021-11-29 NOTE — PROVIDER CONTACT NOTE (OTHER) - ACTION/TREATMENT ORDERED:
Message left with office staff - # [phone]. I&D consult ordered- MD Schuster covering per office.

## 2021-11-29 NOTE — CONSULT NOTE ADULT - ASSESSMENT
67 yo M with RIGHT UVJ stone, leukocytosis, lactate 4.2. Recommended RIGHT ureteral stent placement.   - NPO, IVF  - abx  - to OR for emergent RIGHT ureteral stent placement
67 y/o M w/ PMH of DM2, HTN, recent L2-L5 surgery 1 month ago at [place], p/w increased urinary frequency and R flank pain that started last night. Patient went to urgent care center and was told he had blood in urine and referred to ED. In ED, patient found to have R sided obstructive uropathy w/ renal stones, and is s/p stent by Dr. Kim. Patient states he currently feels comfortable. Denies fever, chills, nausea, vomiting, abdominal pain, CP, SOB, cough, runny nose, sore throat. Here LA 3.3, wbc ct 16, CT abd/pelvis remarkable for R sided obstructive uropathy to the level of a 2 mm stone at the UVJ with urothelial enhancement and R perinephric stranding, additional nonobstructing 3mm R upper pole stone. Was eval by urology, s/p cystoscopy- R ureteral stent placement, and extraction of bladder stone, was given IV rocephin.     1. sepsis. R pyelonephritis. cystitis. R obstructive uropathy s/p ureteral stent. bladder, ureteral stones   - imaging reviewed  - urology eval appreciated  - f/u urine cx, blood cx  - agree with IV rocephin 1gm daily #2   - monitor temps  - tolerating abx well so far; no side effects noted  - reason for abx use and side effects reviewed with patient  - supportive care  - fu cbc    2. other issues - care per medicine

## 2021-11-29 NOTE — CONSULT NOTE ADULT - SUBJECTIVE AND OBJECTIVE BOX
Patient is a 66y old  Male who presents with a chief complaint of increased urinary frequency and R flank pain that started last night (2021 23:10)    HPI:  67 y/o M w/ PMH of DM2, HTN, recent L2-L5 surgery 1 month ago at [place], p/w increased urinary frequency and R flank pain that started last night. Patient went to urgent care center and was told he had blood in urine and referred to ED. In ED, patient found to have R sided obstructive uropathy w/ renal stones, and is s/p stent by Dr. Kim. Patient states he currently feels comfortable. Denies fever, chills, nausea, vomiting, abdominal pain, CP, SOB, cough, runny nose, sore throat. Here LA 3.3, wbc ct 16, CT abd/pelvis remarkable for R sided obstructive uropathy to the level of a 2 mm stone at the UVJ with urothelial enhancement and R perinephric stranding, additional nonobstructing 3mm R upper pole stone. Was eval by urology, s/p cystoscopy- R ureteral stent placement, and extraction of bladder stone, was given IV rocephin.       PSH: Lumbar surgery   PMH: as above    Meds: per reconciliation sheet, noted below  MEDICATIONS  (STANDING):  cefTRIAXone Injectable. 1000 milliGRAM(s) IV Push every 24 hours  dextrose 40% Gel 15 Gram(s) Oral once  dextrose 5%. 1000 milliLiter(s) (50 mL/Hr) IV Continuous <Continuous>  dextrose 5%. 1000 milliLiter(s) (100 mL/Hr) IV Continuous <Continuous>  dextrose 50% Injectable 25 Gram(s) IV Push once  dextrose 50% Injectable 12.5 Gram(s) IV Push once  dextrose 50% Injectable 25 Gram(s) IV Push once  diltiazem    milliGRAM(s) Oral daily  glucagon  Injectable 1 milliGRAM(s) IntraMuscular once  influenza  Vaccine (HIGH DOSE) 0.7 milliLiter(s) IntraMuscular once  insulin lispro (ADMELOG) corrective regimen sliding scale   SubCutaneous three times a day before meals  insulin lispro (ADMELOG) corrective regimen sliding scale   SubCutaneous at bedtime  ondansetron Injectable 4 milliGRAM(s) IV Push every 6 hours  sodium chloride 0.9%. 1000 milliLiter(s) (100 mL/Hr) IV Continuous <Continuous>  tamsulosin 0.4 milliGRAM(s) Oral at bedtime    Allergies    No Known Allergies    Intolerances      Social: no smoking, no alcohol, no illegal drugs; no recent travel, no exposure to TB  FAMILY HISTORY:     no history of premature cardiovascular disease in first degree relatives    ROS:  no HA, no dizziness, no sore throat, no blurry vision, no CP, no palpitations, no SOB, no cough, no diarrhea, , no leg pain, no claudication, no rash, no joint aches, no rectal pain or bleeding, no night sweats + fevers + flank pain + dysuria     All other systems reviewed and are negative    Vital Signs Last 24 Hrs  T(C): 37.6 (2021 10:13), Max: 37.6 (2021 10:13)  T(F): 99.7 (2021 10:13), Max: 99.7 (2021 10:13)  HR: 85 (2021 10:13) (85 - 93)  BP: 150/61 (2021 10:13) (118/74 - 159/80)  BP(mean): 83 (2021 10:13) (83 - 109)  RR: 16 (2021 06:22) (14 - 16)  SpO2: 96% (2021 10:13) (96% - 100%)  Daily Height in cm: 172.72 (2021 12:22)    Daily     PE:  Constitutional: NAD   HEENT: NC/AT, EOMI, PERRLA, conjunctivae clear; ears and nose atraumatic; pharynx benign  Neck: supple; thyroid not palpable  Back: no tenderness  Respiratory: respiratory effort normal; clear to auscultation  Cardiovascular: S1S2 regular, no murmurs  Abdomen: soft, not tender, not distended, positive BS; liver and spleen WNL  Genitourinary: no suprapubic tenderness  Lymphatic: no LN palpable  Musculoskeletal: no muscle tenderness, no joint swelling or tenderness  Extremities: no pedal edema  Neurological/ Psychiatric: AxOx3, Judgement and insight normal;  moving all extremities  Skin: no rashes; no palpable lesions    Labs: all available labs reviewed                        11.8   16.09 )-----------( 301      ( 2021 06:19 )             36.6     11    143  |  110<H>  |  11  ----------------------------<  116<H>  3.7   |  28  |  0.77    Ca    9.0      2021 06:19    TPro  7.0  /  Alb  3.1<L>  /  TBili  0.4  /  DBili  x   /  AST  17  /  ALT  19  /  AlkPhos  79       LIVER FUNCTIONS - ( 2021 06:19 )  Alb: 3.1 g/dL / Pro: 7.0 gm/dL / ALK PHOS: 79 U/L / ALT: 19 U/L / AST: 17 U/L / GGT: x           Urinalysis Basic - ( 2021 14:02 )    Color: Yellow / Appearance: Clear / S.020 / pH: x  Gluc: x / Ketone: Trace  / Bili: Negative / Urobili: Negative mg/dL   Blood: x / Protein: 100 mg/dL / Nitrite: Negative   Leuk Esterase: Trace / RBC: 25-50 /HPF / WBC 0-2   Sq Epi: x / Non Sq Epi: Occasional / Bacteria: Negative          Radiology: all available radiological tests reviewed  < from: CT Abdomen and Pelvis w/ IV Cont (21 @ 15:17) >  EXAM:  CT ABDOMEN AND PELVIS IC                            PROCEDURE DATE:  2021          INTERPRETATION:  CLINICAL INFORMATION: Right flank pain. Assess for pyelonephritis.    COMPARISON: None.    CONTRAST/COMPLICATIONS:  IV Contrast: Omnipaque 350  90 cc administered   10 cc discarded  Oral Contrast: NONE  Complications: None reported at time of study completion    PROCEDURE:  CT of the Abdomen and Pelvis was performed.  Sagittal and coronal reformats were performed.    FINDINGS:  LOWERCHEST: Coronary artery calcifications.    LIVER: Within normal limits.  BILE DUCTS: Normal caliber.  GALLBLADDER: Within normal limits.  SPLEEN: Within normal limits.  PANCREAS: Within normal limits.  ADRENALS: Within normal limits.  KIDNEYS/URETERS:Mild right hydroureteronephrosis to the level of a 2 mm stone at the right UVJ. Additionally, there is a nonobstructing 3 mm right upper pole stone. Mild associated right urothelial enhancement and perinephric fat stranding. Subcentimeter hypodensities bilaterally that are too small to characterize.    BLADDER: Within normal limits.  REPRODUCTIVE ORGANS: Prostatomegaly measuring 5.7 cm in transverse dimension.    BOWEL: No bowel obstruction. Appendix is normal.  PERITONEUM: No ascites.  VESSELS: Atherosclerotic changes.  RETROPERITONEUM/LYMPH NODES: No lymphadenopathy.  ABDOMINAL WALL: Within normal limits.  BONES: Degenerative changes. Status post lumbar laminectomy.    IMPRESSION:  Right-sided obstructive uropathy to the level of a 2 mm stone at the UVJ. Mild associated urothelial enhancement, likely inflammatory. However, correlate with urinalysis to exclude ascending infection.      Advanced directives addressed: full resuscitation
 67 y/o male with a PMHx of DM, HTN, UTI presents to the ED c/o urinary frequency and right flank pain since last night. Pt was seen at urgent care PTA and was sent to the ED for evaluation. Denies fevers. NKDA. Nonsmoker. No EtOH use. No other complaints at this time.  He reports kidney stones in the past x2 episodes but decades ago.     Past Medical, Past Surgical, and Family History:  PAST MEDICAL HISTORY:  DM (diabetes mellitus)   HTN (hypertension).        FHx: noncontributory to stones     Allergies:  	No Known Allergies:     Vital Signs Last 24 Hrs  T(C): 36.7 (28 Nov 2021 13:23), Max: 36.7 (28 Nov 2021 13:23)  T(F): 98.1 (28 Nov 2021 13:23), Max: 98.1 (28 Nov 2021 13:23)  HR: 88 (28 Nov 2021 13:23) (88 - 88)  BP: 159/80 (28 Nov 2021 13:23) (159/80 - 159/80)  BP(mean): 101 (28 Nov 2021 13:23) (101 - 101)  RR: 16 (28 Nov 2021 13:23) (16 - 16)  SpO2: 99% (28 Nov 2021 13:23) (99% - 99%)    NAD, A+Ox3  RRR  no increased WOB  ABD S NT ND, no SP tendernss  RIGHT CVA tenderness                          13.8   16.71 )-----------( 337      ( 28 Nov 2021 14:02 )             41.9     11-28    137  |  103  |  16  ----------------------------<  159<H>  4.0   |  25  |  1.04    Ca    9.6      28 Nov 2021 14:02    TPro  8.5<H>  /  Alb  3.7  /  TBili  0.3  /  DBili  x   /  AST  14<L>  /  ALT  25  /  AlkPhos  99  11-28    Lactate, Blood: 4.2:    < from: CT Abdomen and Pelvis w/ IV Cont (11.28.21 @ 15:17) >  Mild right hydroureteronephrosis to the level of a 2 mm stone at the right UVJ. Additionally, there is a nonobstructing 3 mm right upper pole stone. Mild associated right urothelial enhancement and perinephric fat stranding. Subcentimeter hypodensities bilaterally that are too small to characterize.    < end of copied text >

## 2021-11-30 ENCOUNTER — TRANSCRIPTION ENCOUNTER (OUTPATIENT)
Age: 66
End: 2021-11-30

## 2021-11-30 VITALS
OXYGEN SATURATION: 99 % | RESPIRATION RATE: 17 BRPM | HEART RATE: 92 BPM | SYSTOLIC BLOOD PRESSURE: 124 MMHG | DIASTOLIC BLOOD PRESSURE: 80 MMHG | TEMPERATURE: 98 F

## 2021-11-30 LAB
ANION GAP SERPL CALC-SCNC: 5 MMOL/L — SIGNIFICANT CHANGE UP (ref 5–17)
ANION GAP SERPL CALC-SCNC: 7 MMOL/L — SIGNIFICANT CHANGE UP (ref 5–17)
BASOPHILS # BLD AUTO: 0.06 K/UL — SIGNIFICANT CHANGE UP (ref 0–0.2)
BASOPHILS NFR BLD AUTO: 0.5 % — SIGNIFICANT CHANGE UP (ref 0–2)
BUN SERPL-MCNC: 8 MG/DL — SIGNIFICANT CHANGE UP (ref 7–23)
BUN SERPL-MCNC: 9 MG/DL — SIGNIFICANT CHANGE UP (ref 7–23)
CALCIUM SERPL-MCNC: 8.8 MG/DL — SIGNIFICANT CHANGE UP (ref 8.5–10.1)
CALCIUM SERPL-MCNC: 9.1 MG/DL — SIGNIFICANT CHANGE UP (ref 8.5–10.1)
CHLORIDE SERPL-SCNC: 103 MMOL/L — SIGNIFICANT CHANGE UP (ref 96–108)
CHLORIDE SERPL-SCNC: 105 MMOL/L — SIGNIFICANT CHANGE UP (ref 96–108)
CO2 SERPL-SCNC: 26 MMOL/L — SIGNIFICANT CHANGE UP (ref 22–31)
CO2 SERPL-SCNC: 28 MMOL/L — SIGNIFICANT CHANGE UP (ref 22–31)
CREAT SERPL-MCNC: 0.54 MG/DL — SIGNIFICANT CHANGE UP (ref 0.5–1.3)
CREAT SERPL-MCNC: 0.65 MG/DL — SIGNIFICANT CHANGE UP (ref 0.5–1.3)
EOSINOPHIL # BLD AUTO: 0.17 K/UL — SIGNIFICANT CHANGE UP (ref 0–0.5)
EOSINOPHIL NFR BLD AUTO: 1.4 % — SIGNIFICANT CHANGE UP (ref 0–6)
GLUCOSE SERPL-MCNC: 121 MG/DL — HIGH (ref 70–99)
GLUCOSE SERPL-MCNC: 150 MG/DL — HIGH (ref 70–99)
HCT VFR BLD CALC: 36.1 % — LOW (ref 39–50)
HGB BLD-MCNC: 11.8 G/DL — LOW (ref 13–17)
IMM GRANULOCYTES NFR BLD AUTO: 0.7 % — SIGNIFICANT CHANGE UP (ref 0–1.5)
LYMPHOCYTES # BLD AUTO: 29.9 % — SIGNIFICANT CHANGE UP (ref 13–44)
LYMPHOCYTES # BLD AUTO: 3.6 K/UL — HIGH (ref 1–3.3)
MCHC RBC-ENTMCNC: 27.8 PG — SIGNIFICANT CHANGE UP (ref 27–34)
MCHC RBC-ENTMCNC: 32.7 GM/DL — SIGNIFICANT CHANGE UP (ref 32–36)
MCV RBC AUTO: 85.1 FL — SIGNIFICANT CHANGE UP (ref 80–100)
MONOCYTES # BLD AUTO: 0.99 K/UL — HIGH (ref 0–0.9)
MONOCYTES NFR BLD AUTO: 8.2 % — SIGNIFICANT CHANGE UP (ref 2–14)
NEUTROPHILS # BLD AUTO: 7.16 K/UL — SIGNIFICANT CHANGE UP (ref 1.8–7.4)
NEUTROPHILS NFR BLD AUTO: 59.3 % — SIGNIFICANT CHANGE UP (ref 43–77)
PLATELET # BLD AUTO: 289 K/UL — SIGNIFICANT CHANGE UP (ref 150–400)
POTASSIUM SERPL-MCNC: 2.9 MMOL/L — CRITICAL LOW (ref 3.5–5.3)
POTASSIUM SERPL-MCNC: 3.4 MMOL/L — LOW (ref 3.5–5.3)
POTASSIUM SERPL-SCNC: 2.9 MMOL/L — CRITICAL LOW (ref 3.5–5.3)
POTASSIUM SERPL-SCNC: 3.4 MMOL/L — LOW (ref 3.5–5.3)
RBC # BLD: 4.24 M/UL — SIGNIFICANT CHANGE UP (ref 4.2–5.8)
RBC # FLD: 14.1 % — SIGNIFICANT CHANGE UP (ref 10.3–14.5)
SODIUM SERPL-SCNC: 136 MMOL/L — SIGNIFICANT CHANGE UP (ref 135–145)
SODIUM SERPL-SCNC: 138 MMOL/L — SIGNIFICANT CHANGE UP (ref 135–145)
WBC # BLD: 12.06 K/UL — HIGH (ref 3.8–10.5)
WBC # FLD AUTO: 12.06 K/UL — HIGH (ref 3.8–10.5)

## 2021-11-30 PROCEDURE — 99239 HOSP IP/OBS DSCHRG MGMT >30: CPT

## 2021-11-30 RX ORDER — CEFUROXIME AXETIL 250 MG
1 TABLET ORAL
Qty: 22 | Refills: 0
Start: 2021-11-30 | End: 2021-12-10

## 2021-11-30 RX ORDER — BNT162B2 0.23 MG/2.25ML
0.3 INJECTION, SUSPENSION INTRAMUSCULAR
Qty: 0 | Refills: 0 | DISCHARGE

## 2021-11-30 RX ORDER — POTASSIUM CHLORIDE 20 MEQ
40 PACKET (EA) ORAL EVERY 4 HOURS
Refills: 0 | Status: COMPLETED | OUTPATIENT
Start: 2021-11-30 | End: 2021-11-30

## 2021-11-30 RX ADMIN — Medication 5 MILLIGRAM(S): at 13:12

## 2021-11-30 RX ADMIN — Medication 240 MILLIGRAM(S): at 10:21

## 2021-11-30 RX ADMIN — Medication 200 MILLIGRAM(S): at 13:12

## 2021-11-30 RX ADMIN — SODIUM CHLORIDE 100 MILLILITER(S): 9 INJECTION INTRAMUSCULAR; INTRAVENOUS; SUBCUTANEOUS at 08:08

## 2021-11-30 RX ADMIN — CEFTRIAXONE 1000 MILLIGRAM(S): 500 INJECTION, POWDER, FOR SOLUTION INTRAMUSCULAR; INTRAVENOUS at 13:12

## 2021-11-30 RX ADMIN — Medication 40 MILLIEQUIVALENT(S): at 13:13

## 2021-11-30 RX ADMIN — Medication 40 MILLIEQUIVALENT(S): at 09:14

## 2021-11-30 NOTE — DISCHARGE NOTE PROVIDER - CARE PROVIDER_API CALL
Fidel Kim)  Urology  [address], [address] [place] [address]  Phone: [phone]  Fax: [phone]  Follow Up Time:

## 2021-11-30 NOTE — DISCHARGE NOTE NURSING/CASE MANAGEMENT/SOCIAL WORK - NSDCPEFALRISK_GEN_ALL_CORE
For information on Fall & Injury Prevention, visit: [email or web address] OR  [email or web address] OR  https://www.cdc.gov/steadi/patient.html

## 2021-11-30 NOTE — PROGRESS NOTE ADULT - SUBJECTIVE AND OBJECTIVE BOX
Date of service: 21 @ 11:02    pt seen and examined  feels better   denies abd pain  no fevers, n/v    ROS: no fever or chills; denies dizziness, no HA, no SOB or cough, no abdominal pain, no diarrhea or constipation; no dysuria, no urinary frequency, no legs pain, no rashes    MEDICATIONS  (STANDING):  cefTRIAXone Injectable. 1000 milliGRAM(s) IV Push every 24 hours  dextrose 40% Gel 15 Gram(s) Oral once  dextrose 5%. 1000 milliLiter(s) (50 mL/Hr) IV Continuous <Continuous>  dextrose 5%. 1000 milliLiter(s) (100 mL/Hr) IV Continuous <Continuous>  dextrose 50% Injectable 25 Gram(s) IV Push once  dextrose 50% Injectable 12.5 Gram(s) IV Push once  dextrose 50% Injectable 25 Gram(s) IV Push once  diltiazem    milliGRAM(s) Oral daily  glucagon  Injectable 1 milliGRAM(s) IntraMuscular once  influenza  Vaccine (HIGH DOSE) 0.7 milliLiter(s) IntraMuscular once  insulin lispro (ADMELOG) corrective regimen sliding scale   SubCutaneous three times a day before meals  insulin lispro (ADMELOG) corrective regimen sliding scale   SubCutaneous at bedtime  ondansetron Injectable 4 milliGRAM(s) IV Push every 6 hours  potassium chloride    Tablet ER 40 milliEquivalent(s) Oral every 4 hours  tamsulosin 0.4 milliGRAM(s) Oral at bedtime      Vital Signs Last 24 Hrs  T(C): 36.9 (2021 10:00), Max: 37.1 (2021 16:46)  T(F): 98.5 (2021 10:00), Max: 98.7 (2021 16:46)  HR: 106 (2021 10:00) (87 - 106)  BP: 141/70 (2021 10:00) (136/60 - 141/70)  BP(mean): 86 (2021 10:00) (85 - 86)  RR: 16 (2021 10:00) (16 - 17)  SpO2: 94% (2021 10:00) (94% - 96%)          PE:  Constitutional: NAD   HEENT: NC/AT, EOMI, PERRLA, conjunctivae clear; ears and nose atraumatic; pharynx benign  Neck: supple; thyroid not palpable  Back: no tenderness  Respiratory: respiratory effort normal; clear to auscultation  Cardiovascular: S1S2 regular, no murmurs  Abdomen: soft, not tender, not distended, positive BS; liver and spleen WNL  Genitourinary: no suprapubic tenderness  Lymphatic: no LN palpable  Musculoskeletal: no muscle tenderness, no joint swelling or tenderness  Extremities: no pedal edema  Neurological/ Psychiatric: AxOx3, Judgement and insight normal;  moving all extremities  Skin: no rashes; no palpable lesions    Labs: all available labs reviewed                                    )-----------( 289      ( 2021 07:49 )             36.1         136  |  103  |  8   ----------------------------<  121<H>  2.9<LL>   |  26  |  0.54    Ca    8.8      2021 07:49    TPro  7.0  /  Alb  3.1<L>  /  TBili  0.4  /  DBili  x   /  AST  17  /  ALT  19  /  AlkPhos  79             Urinalysis Basic - ( 2021 14:02 )    Color: Yellow / Appearance: Clear / S.020 / pH: x  Gluc: x / Ketone: Trace  / Bili: Negative / Urobili: Negative mg/dL   Blood: x / Protein: 100 mg/dL / Nitrite: Negative   Leuk Esterase: Trace / RBC: 25-50 /HPF / WBC 0-2   Sq Epi: x / Non Sq Epi: Occasional / Bacteria: Negative    Culture - Blood (21 @ 15:36)   Specimen Source: .Blood None   Culture Results:   No growth to date. Culture - Blood (21 @ 15:36)   Specimen Source: .Blood None   Culture Results:   No growth to date.   Culture - Urine (21 @ 14:02)   Specimen Source: Clean Catch None   Culture Results:   <10,000 CFU/mL Normal Urogenital Melissa     Radiology: all available radiological tests reviewed  < from: CT Abdomen and Pelvis w/ IV Cont (21 @ 15:17) >  EXAM:  CT ABDOMEN AND PELVIS IC                            PROCEDURE DATE:  2021          INTERPRETATION:  CLINICAL INFORMATION: Right flank pain. Assess for pyelonephritis.    COMPARISON: None.    CONTRAST/COMPLICATIONS:  IV Contrast: Omnipaque 350  90 cc administered   10 cc discarded  Oral Contrast: NONE  Complications: None reported at time of study completion    PROCEDURE:  CT of the Abdomen and Pelvis was performed.  Sagittal and coronal reformats were performed.    FINDINGS:  LOWERCHEST: Coronary artery calcifications.    LIVER: Within normal limits.  BILE DUCTS: Normal caliber.  GALLBLADDER: Within normal limits.  SPLEEN: Within normal limits.  PANCREAS: Within normal limits.  ADRENALS: Within normal limits.  KIDNEYS/URETERS:Mild right hydroureteronephrosis to the level of a 2 mm stone at the right UVJ. Additionally, there is a nonobstructing 3 mm right upper pole stone. Mild associated right urothelial enhancement and perinephric fat stranding. Subcentimeter hypodensities bilaterally that are too small to characterize.    BLADDER: Within normal limits.  REPRODUCTIVE ORGANS: Prostatomegaly measuring 5.7 cm in transverse dimension.    BOWEL: No bowel obstruction. Appendix is normal.  PERITONEUM: No ascites.  VESSELS: Atherosclerotic changes.  RETROPERITONEUM/LYMPH NODES: No lymphadenopathy.  ABDOMINAL WALL: Within normal limits.  BONES: Degenerative changes. Status post lumbar laminectomy.    IMPRESSION:  Right-sided obstructive uropathy to the level of a 2 mm stone at the UVJ. Mild associated urothelial enhancement, likely inflammatory. However, correlate with urinalysis to exclude ascending infection.      Advanced directives addressed: full resuscitation
67 y/o M w/ PMH of DM2, HTN, recent L2-L5 surgery 1 month ago at [place], p/w increased urinary frequency and R flank pain that started last night. Patient went to urgent care center and was told he had blood in urine and referred to ED. In ED, patient found to have R sided obstructive uropathy w/ renal stones, and is s/p stent by Dr. Kim. Patient states he currently feels comfortable. Denies fever, chills, nausea, vomiting, abdominal pain, CP, SOB, cough, runny nose, sore throat      R flank pain resolved, urinary frequency resolved , feels better overall      PHYSICAL EXAM:    Daily     Daily     ICU Vital Signs Last 24 Hrs  T(C): 37.1 (2021 16:46), Max: 37.6 (2021 10:13)  T(F): 98.7 (2021 16:46), Max: 99.7 (2021 10:13)  HR: 99 (2021 16:46) (85 - 99)  BP: 139/68 (2021 16:46) (118/74 - 150/61)  BP(mean): 85 (2021 16:46) (83 - 109)  ABP: --  ABP(mean): --  RR: 17 (2021 16:46) (14 - 17)  SpO2: 96% (2021 16:46) (96% - 100%)      Constitutional:NAD  HEENT: Atraumatic, PARVEEN, Normal, No congestion  Respiratory: Breath Sounds normal, no rhonchi/wheeze  Cardiovascular: N S1S2;  Gastrointestinal: Abdomen soft, non tender, Bowel Ssounds present  Extremities: No edema, peripheral pulses present  Neurological: AAO x 3, no gross focal motor deficits                            11.8   16.09 )-----------( 301      ( 2021 06:19 )             36.6       CBC Full  -  ( 2021 06:19 )  WBC Count : 16.09 K/uL  RBC Count : 4.21 M/uL  Hemoglobin : 11.8 g/dL  Hematocrit : 36.6 %  Platelet Count - Automated : 301 K/uL  Mean Cell Volume : 86.9 fl  Mean Cell Hemoglobin : 28.0 pg  Mean Cell Hemoglobin Concentration : 32.2 gm/dL  Auto Neutrophil # : 10.70 K/uL  Auto Lymphocyte # : 3.96 K/uL  Auto Monocyte # : 1.24 K/uL  Auto Eosinophil # : 0.05 K/uL  Auto Basophil # : 0.04 K/uL  Auto Neutrophil % : 66.6 %  Auto Lymphocyte % : 24.6 %  Auto Monocyte % : 7.7 %  Auto Eosinophil % : 0.3 %  Auto Basophil % : 0.2 %          143  |  110<H>  |  11  ----------------------------<  116<H>  3.7   |  28  |  0.77    Ca    9.0      2021 06:19    TPro  7.0  /  Alb  3.1<L>  /  TBili  0.4  /  DBili  x   /  AST  17  /  ALT  19  /  AlkPhos  79  11-      LIVER FUNCTIONS - ( 2021 06:19 )  Alb: 3.1 g/dL / Pro: 7.0 gm/dL / ALK PHOS: 79 U/L / ALT: 19 U/L / AST: 17 U/L / GGT: x             PT/INR - ( 2021 06:19 )   PT: 13.4 sec;   INR: 1.16 ratio         PTT - ( 2021 06:19 )  PTT:30.0 sec          Urinalysis Basic - ( 2021 14:02 )    Color: Yellow / Appearance: Clear / S.020 / pH: x  Gluc: x / Ketone: Trace  / Bili: Negative / Urobili: Negative mg/dL   Blood: x / Protein: 100 mg/dL / Nitrite: Negative   Leuk Esterase: Trace / RBC: 25-50 /HPF / WBC 0-2   Sq Epi: x / Non Sq Epi: Occasional / Bacteria: Negative            MEDICATIONS  (STANDING):  cefTRIAXone Injectable. 1000 milliGRAM(s) IV Push every 24 hours  dextrose 40% Gel 15 Gram(s) Oral once  dextrose 5%. 1000 milliLiter(s) (50 mL/Hr) IV Continuous <Continuous>  dextrose 5%. 1000 milliLiter(s) (100 mL/Hr) IV Continuous <Continuous>  dextrose 50% Injectable 25 Gram(s) IV Push once  dextrose 50% Injectable 12.5 Gram(s) IV Push once  dextrose 50% Injectable 25 Gram(s) IV Push once  diltiazem    milliGRAM(s) Oral daily  glucagon  Injectable 1 milliGRAM(s) IntraMuscular once  influenza  Vaccine (HIGH DOSE) 0.7 milliLiter(s) IntraMuscular once  insulin lispro (ADMELOG) corrective regimen sliding scale   SubCutaneous three times a day before meals  insulin lispro (ADMELOG) corrective regimen sliding scale   SubCutaneous at bedtime  ondansetron Injectable 4 milliGRAM(s) IV Push every 6 hours  sodium chloride 0.9%. 1000 milliLiter(s) (100 mL/Hr) IV Continuous <Continuous>  tamsulosin 0.4 milliGRAM(s) Oral at bedtime

## 2021-11-30 NOTE — DISCHARGE NOTE PROVIDER - NSDCCPCAREPLAN_GEN_ALL_CORE_FT
PRINCIPAL DISCHARGE DIAGNOSIS  Diagnosis: Urinary tract obstruction by kidney stone  Assessment and Plan of Treatment: please follow up with Dr Fidel Kim urology - ureteral stent was placed on the right by urology due to obstructing right  ureteral stone   complete antibiotic course as prescribed for pyelonephritis treatment      SECONDARY DISCHARGE DIAGNOSES  Diagnosis: Sepsis  Assessment and Plan of Treatment:     Diagnosis: Acute UTI  Assessment and Plan of Treatment:      PRINCIPAL DISCHARGE DIAGNOSIS  Diagnosis: Urinary tract obstruction by kidney stone  Assessment and Plan of Treatment: please follow up with Dr Fidel Kim urology in 1 to 2weeks - ureteral stent was placed on the right by urology due to obstructing right  ureteral stone   complete antibiotic course as prescribed for pyelonephritis treatment      SECONDARY DISCHARGE DIAGNOSES  Diagnosis: Sepsis  Assessment and Plan of Treatment:     Diagnosis: Acute UTI  Assessment and Plan of Treatment:

## 2021-11-30 NOTE — DOWNTIME INTERRUPTION NOTE - WHICH MANUAL FORMS INITIATED?
Integrated progress notes; [place] chart prep check list: POC/Adult assessment and intervention flowsheet

## 2021-11-30 NOTE — DISCHARGE NOTE PROVIDER - NSDCMRMEDTOKEN_GEN_ALL_CORE_FT
cefuroxime 500 mg oral tablet: 1 tab(s) orally every 12 hours  dilTIAZem 240 mg/24 hours oral capsule, extended release: 1 cap(s) orally once a day  metFORMIN 500 mg oral tablet: 2 tab(s) orally 2 times a day  tamsulosin 0.4 mg oral capsule: 1 cap(s) orally once a day

## 2021-11-30 NOTE — DISCHARGE NOTE PROVIDER - NSDCHC_MEDRECSTATUS_GEN_ALL_CORE
Admission Reconciliation is Completed  Discharge Reconciliation is Completed <<-----Click here for Discharge Medication Review

## 2021-11-30 NOTE — DISCHARGE NOTE PROVIDER - HOSPITAL COURSE
67 y/o M w/ PMH of DM2, HTN, recent L2-L5 surgery 1 month ago at [place], p/w increased urinary frequency and R flank pain that started last night. Patient went to urgent care center and was told he had blood in urine and referred to ED. In ED, patient found to have R sided obstructive uropathy w/ renal stones, and is s/p stent by Dr. Noel. Patient states he currently feels comfortable. Denies fever, chills, nausea, vomiting, abdominal pain, CP, SOB, cough, runny nose, sore throat 67 y/o M w/ PMH of DM2, HTN, recent L2-L5 surgery 1 month ago at [place], p/w increased urinary frequency and R flank pain that started last night  admitted for   R pyelonephritis   Sepsis ruled out   R sided obstructive uropathy w/ 2mm UVJ stone  s/p stent placement   -Pain control   -CT also reports prostatomegaly + hypodensities -> f/u  asoutpatient   -Lactic acidosis  secondary to dehydration resolved   s/p ceftriaxone #3, continue ceftin 500 BID for 11 days more  blood cx neg, ucx neg   medically stable for discharge  R flank pain and urinary frequency resolved    I dw with Dr Fidel noel and ID   f/u urology in 1 week      *DM2  A1c 6.2 controlled   continue metformin upon discharge    *HTN  -C/w home meds     *Recent lumbar surgery  -F/u outpatient w/ ortho  walks at home with cane   pt declined home care services   patient lives with family       11/30  R flank pain resolved, urinary frequency resolved , feels better overall    Constitutional:NAD  HEENT: Atraumatic, PARVEEN, Normal, No congestion  Respiratory: Breath Sounds normal, no rhonchi/wheeze  Cardiovascular: N S1S2;  Gastrointestinal: Abdomen soft, non tender, Bowel Ssounds present  Extremities: No edema, peripheral pulses present  Neurological: AAO x 3, no gross focal motor deficits    discharge time 47mins

## 2021-11-30 NOTE — DISCHARGE NOTE NURSING/CASE MANAGEMENT/SOCIAL WORK - PATIENT PORTAL LINK FT
You can access the FollowMyHealth Patient Portal offered by [facility] by registering at the following website: [email or web address]. By joining [employer]’s FollowMyHealth portal, you will also be able to view your health information using other applications (apps) compatible with our system.

## 2021-11-30 NOTE — PROGRESS NOTE ADULT - ASSESSMENT
65 y/o M w/ PMH of DM2, HTN, recent L2-L5 surgery 1 month ago at [place], p/w increased urinary frequency and R flank pain that started last night. Patient went to urgent care center and was told he had blood in urine and referred to ED. In ED, patient found to have R sided obstructive uropathy w/ renal stones, and is s/p stent by Dr. Kim. Patient states he currently feels comfortable. Denies fever, chills, nausea, vomiting, abdominal pain, CP, SOB, cough, runny nose, sore throat. Here LA 3.3, wbc ct 16, CT abd/pelvis remarkable for R sided obstructive uropathy to the level of a 2 mm stone at the UVJ with urothelial enhancement and R perinephric stranding, additional nonobstructing 3mm R upper pole stone. Was eval by urology, s/p cystoscopy- R ureteral stent placement, and extraction of bladder stone, was given IV rocephin.     1. sepsis. R pyelonephritis. cystitis. R obstructive uropathy s/p ureteral stent. bladder, ureteral stones   - imaging reviewed  - urology eval appreciated  - urine cx, blood cx no growth, pt was on po abx prior to admit could have sterilized cx  - on IV rocephin 1gm daily #3  - monitor temps  - tolerating abx well so far; no side effects noted  - reason for abx use and side effects reviewed with patient  - dc with oral ceftin 500mg BID 11 more days  - supportive care  - fu cbc    2. other issues - care per medicine 
65 y/o M w/ PMH of DM2, HTN, recent L2-L5 surgery 1 month ago at [place], p/w increased urinary frequency and R flank pain that started last night  R pyelonephritis   *R sided obstructive uropathy w/ renal stones s/p stent placement   -Pain control   -CT also reports prostatomegaly + hypodensities -> f/u   -Lactic acidosis trending down,   -Will c/w ceftriaxone given leukocytosis     *DM2  -Humalog ISS    *HTN  -C/w home meds     *Recent lumbar surgery  -F/u outpatient w/ ortho*DVT ppx  -SCDs

## 2021-11-30 NOTE — CDI QUERY NOTE - NSCDIOTHERTXTBX_GEN_ALL_CORE_HH
The Physician's or Provider's documentation of sepsis is clinically supported by the patient's presentation, evaluation and medical management, as indicated below.  There is a need to further clarify the status of sepsis.  Please indicate status of the patient's sepsis diagnosis in your progress notes and discharge summary as : Sepsis treatment continues, or Sepsis resolving, or Sepsis resolved, or Sepsis ruled-out.     STATUS:  Sepsis ruled in and POA  Sepsis is resolving and POA  Sepsis ruled out  Early Sepsis POA, resolved?  Not clinically significant  Other ( Please specify)     CHART DOCUMENTATION:    ED provider documentation on 11/28/2021:  Principal Discharge DX:	Urinary tract obstruction by kidney stone  Secondary Diagnosis:	Sepsis  Secondary Diagnosis:	Acute UTI.    SEPSIS – was this patient treated for sepsis? Yes    Infectious disease documentation on 11/29/2021:  sepsis. R pyelonephritis. cystitis. R obstructive uropathy s/p ureteral stent. bladder, ureteral stones    T(C): 37.6 (29 Nov 2021 10:13), Max: 37.6 (29 Nov 2021 10:13)  T(F): 99.7 (29 Nov 2021 10:13), Max: 99.7 (29 Nov 2021 10:13)  HR: 85 (29 Nov 2021 10:13) (85 - 93)  BP: 150/61 (29 Nov 2021 10:13) (118/74 - 159/80)  BP(mean): 83 (29 Nov 2021 10:13) (83 - 109)  RR: 16 (29 Nov 2021 06:22) (14 - 16)  SpO2: 96% (29 Nov 2021 10:13) (96% - 100%)    WBC Count: 12.06 K/uL (11.30.21 @ 07:49)   WBC Count: 16.09 K/uL (11.29.21 @ 06:19)   WBC Count: 16.71 K/uL (11.28.21 @ 14:02)     Lactate, Blood: 2.0 mmol/L (11.29.21 @ 06:19)   Lactate, Blood: 2.5: Elevated lactate. Consider ordering follow-up lactate to trend. mmol/L (11.28.21 @ 23:50)   Lactate, Blood: 3.1: Elevated lactate. Consider ordering follow-up lactate to trend. mmol/L (11.28.21 @ 18:03)   Lactate, Blood: 4.2: Test Name:lactate Called by:chaya Called to:delio donald   Read back 2 Pt IDs:y Read back values:y Date/Tm:11/28/2021 16:11:32 EST mmol/L (11.28.21 @ 15:36)   Lactate, Blood: 3.3: Elevated lactate. Consider ordering follow-up lactate to trend. mmol/L (11.28.21 @ 14:02)

## 2021-12-01 LAB — NIDUS STONE QN: SIGNIFICANT CHANGE UP

## 2021-12-03 PROBLEM — I10 ESSENTIAL (PRIMARY) HYPERTENSION: Chronic | Status: ACTIVE | Noted: 2021-11-28

## 2021-12-03 PROBLEM — E11.9 TYPE 2 DIABETES MELLITUS WITHOUT COMPLICATIONS: Chronic | Status: ACTIVE | Noted: 2021-11-28

## 2021-12-03 LAB
CULTURE RESULTS: SIGNIFICANT CHANGE UP
CULTURE RESULTS: SIGNIFICANT CHANGE UP
SPECIMEN SOURCE: SIGNIFICANT CHANGE UP
SPECIMEN SOURCE: SIGNIFICANT CHANGE UP

## 2021-12-04 DIAGNOSIS — N13.6 PYONEPHROSIS: ICD-10-CM

## 2021-12-04 DIAGNOSIS — E87.2 ACIDOSIS: ICD-10-CM

## 2021-12-04 DIAGNOSIS — Z20.822 CONTACT WITH AND (SUSPECTED) EXPOSURE TO COVID-19: ICD-10-CM

## 2021-12-04 DIAGNOSIS — E86.0 DEHYDRATION: ICD-10-CM

## 2021-12-04 DIAGNOSIS — E11.9 TYPE 2 DIABETES MELLITUS WITHOUT COMPLICATIONS: ICD-10-CM

## 2021-12-04 DIAGNOSIS — Z79.84 LONG TERM (CURRENT) USE OF ORAL HYPOGLYCEMIC DRUGS: ICD-10-CM

## 2021-12-04 DIAGNOSIS — I10 ESSENTIAL (PRIMARY) HYPERTENSION: ICD-10-CM

## 2021-12-04 DIAGNOSIS — N21.0 CALCULUS IN BLADDER: ICD-10-CM

## 2021-12-16 ENCOUNTER — APPOINTMENT (OUTPATIENT)
Dept: UROLOGY | Facility: CLINIC | Age: 66
End: 2021-12-16
Payer: MEDICARE

## 2021-12-16 PROCEDURE — 99213 OFFICE O/P EST LOW 20 MIN: CPT

## 2021-12-16 NOTE — REVIEW OF SYSTEMS
[Urine Infection (bladder/kidney)] : bladder/kidney infection [Blood in urine that you can see] : blood visible in urine [Told you have blood in urine on a urine test] : told blood was present in a urine test [History of kidney stones] : history of kidney stones [Wake up at night to urinate  How many times?  ___] : wakes up to urinate [unfilled] times during the night [Joint Pain] : joint pain [see HPI] : see HPI [Difficulty Walking] : difficulty walking [Negative] : Heme/Lymph

## 2021-12-16 NOTE — HISTORY OF PRESENT ILLNESS
[FreeTextEntry1] : 66 year old man seen 12/16/2021 for follow up. He is s/p RIGHT ureteral stent placement for 2 mm RIGHT UVJ stone. He reports some flank pain with voiding, but improved since stent placement. Some hematuria. Otherwise tolerating stent well.

## 2021-12-16 NOTE — ASSESSMENT
[FreeTextEntry1] : 67 yo M with RIGHT ureteral stone. Discussed options include CT to check if stone is passed, to remove stent or ureteroscopy. Discussed that CT has risk of false negative given small size of stone. Pt chooses ureteroscopy. will book.

## 2022-02-22 ENCOUNTER — OUTPATIENT (OUTPATIENT)
Dept: OUTPATIENT SERVICES | Facility: HOSPITAL | Age: 67
LOS: 1 days | End: 2022-02-22
Payer: MEDICARE

## 2022-02-22 VITALS
WEIGHT: 158.07 LBS | DIASTOLIC BLOOD PRESSURE: 78 MMHG | TEMPERATURE: 98 F | HEIGHT: 68 IN | HEART RATE: 90 BPM | RESPIRATION RATE: 16 BRPM | SYSTOLIC BLOOD PRESSURE: 140 MMHG

## 2022-02-22 DIAGNOSIS — Z98.890 OTHER SPECIFIED POSTPROCEDURAL STATES: Chronic | ICD-10-CM

## 2022-02-22 DIAGNOSIS — N20.1 CALCULUS OF URETER: ICD-10-CM

## 2022-02-22 DIAGNOSIS — Z01.818 ENCOUNTER FOR OTHER PREPROCEDURAL EXAMINATION: ICD-10-CM

## 2022-02-22 DIAGNOSIS — I10 ESSENTIAL (PRIMARY) HYPERTENSION: ICD-10-CM

## 2022-02-22 DIAGNOSIS — E11.9 TYPE 2 DIABETES MELLITUS WITHOUT COMPLICATIONS: ICD-10-CM

## 2022-02-22 LAB
A1C WITH ESTIMATED AVERAGE GLUCOSE RESULT: 6.6 % — HIGH (ref 4–5.6)
ANION GAP SERPL CALC-SCNC: 6 MMOL/L — SIGNIFICANT CHANGE UP (ref 5–17)
APPEARANCE UR: ABNORMAL
APTT BLD: 37.8 SEC — HIGH (ref 27.5–35.5)
BASOPHILS # BLD AUTO: 0.04 K/UL — SIGNIFICANT CHANGE UP (ref 0–0.2)
BASOPHILS NFR BLD AUTO: 0.3 % — SIGNIFICANT CHANGE UP (ref 0–2)
BILIRUB UR-MCNC: NEGATIVE — SIGNIFICANT CHANGE UP
BUN SERPL-MCNC: 18 MG/DL — SIGNIFICANT CHANGE UP (ref 7–23)
CALCIUM SERPL-MCNC: 9.5 MG/DL — SIGNIFICANT CHANGE UP (ref 8.5–10.1)
CHLORIDE SERPL-SCNC: 108 MMOL/L — SIGNIFICANT CHANGE UP (ref 96–108)
CO2 SERPL-SCNC: 27 MMOL/L — SIGNIFICANT CHANGE UP (ref 22–31)
COLOR SPEC: YELLOW — SIGNIFICANT CHANGE UP
CREAT SERPL-MCNC: 0.82 MG/DL — SIGNIFICANT CHANGE UP (ref 0.5–1.3)
DIFF PNL FLD: ABNORMAL
EOSINOPHIL # BLD AUTO: 0.25 K/UL — SIGNIFICANT CHANGE UP (ref 0–0.5)
EOSINOPHIL NFR BLD AUTO: 2 % — SIGNIFICANT CHANGE UP (ref 0–6)
ESTIMATED AVERAGE GLUCOSE: 143 MG/DL — HIGH (ref 68–114)
GLUCOSE SERPL-MCNC: 127 MG/DL — HIGH (ref 70–99)
GLUCOSE UR QL: NEGATIVE — SIGNIFICANT CHANGE UP
HCT VFR BLD CALC: 42.7 % — SIGNIFICANT CHANGE UP (ref 39–50)
HGB BLD-MCNC: 13.8 G/DL — SIGNIFICANT CHANGE UP (ref 13–17)
IMM GRANULOCYTES NFR BLD AUTO: 1 % — SIGNIFICANT CHANGE UP (ref 0–1.5)
INR BLD: 1.05 RATIO — SIGNIFICANT CHANGE UP (ref 0.88–1.16)
KETONES UR-MCNC: NEGATIVE — SIGNIFICANT CHANGE UP
LEUKOCYTE ESTERASE UR-ACNC: ABNORMAL
LYMPHOCYTES # BLD AUTO: 29.8 % — SIGNIFICANT CHANGE UP (ref 13–44)
LYMPHOCYTES # BLD AUTO: 3.69 K/UL — HIGH (ref 1–3.3)
MCHC RBC-ENTMCNC: 27.6 PG — SIGNIFICANT CHANGE UP (ref 27–34)
MCHC RBC-ENTMCNC: 32.3 GM/DL — SIGNIFICANT CHANGE UP (ref 32–36)
MCV RBC AUTO: 85.4 FL — SIGNIFICANT CHANGE UP (ref 80–100)
MONOCYTES # BLD AUTO: 0.81 K/UL — SIGNIFICANT CHANGE UP (ref 0–0.9)
MONOCYTES NFR BLD AUTO: 6.5 % — SIGNIFICANT CHANGE UP (ref 2–14)
NEUTROPHILS # BLD AUTO: 7.46 K/UL — HIGH (ref 1.8–7.4)
NEUTROPHILS NFR BLD AUTO: 60.4 % — SIGNIFICANT CHANGE UP (ref 43–77)
NITRITE UR-MCNC: NEGATIVE — SIGNIFICANT CHANGE UP
PH UR: 5 — SIGNIFICANT CHANGE UP (ref 5–8)
PLATELET # BLD AUTO: 346 K/UL — SIGNIFICANT CHANGE UP (ref 150–400)
POTASSIUM SERPL-MCNC: 4 MMOL/L — SIGNIFICANT CHANGE UP (ref 3.5–5.3)
POTASSIUM SERPL-SCNC: 4 MMOL/L — SIGNIFICANT CHANGE UP (ref 3.5–5.3)
PROT UR-MCNC: 30 MG/DL
PROTHROM AB SERPL-ACNC: 12.3 SEC — SIGNIFICANT CHANGE UP (ref 10.6–13.6)
RBC # BLD: 5 M/UL — SIGNIFICANT CHANGE UP (ref 4.2–5.8)
RBC # FLD: 14.1 % — SIGNIFICANT CHANGE UP (ref 10.3–14.5)
SODIUM SERPL-SCNC: 141 MMOL/L — SIGNIFICANT CHANGE UP (ref 135–145)
SP GR SPEC: 1.02 — SIGNIFICANT CHANGE UP (ref 1.01–1.02)
UROBILINOGEN FLD QL: NEGATIVE — SIGNIFICANT CHANGE UP
WBC # BLD: 12.37 K/UL — HIGH (ref 3.8–10.5)
WBC # FLD AUTO: 12.37 K/UL — HIGH (ref 3.8–10.5)

## 2022-02-22 PROCEDURE — 80048 BASIC METABOLIC PNL TOTAL CA: CPT

## 2022-02-22 PROCEDURE — 81001 URINALYSIS AUTO W/SCOPE: CPT

## 2022-02-22 PROCEDURE — 86900 BLOOD TYPING SEROLOGIC ABO: CPT

## 2022-02-22 PROCEDURE — 83036 HEMOGLOBIN GLYCOSYLATED A1C: CPT

## 2022-02-22 PROCEDURE — 85025 COMPLETE CBC W/AUTO DIFF WBC: CPT

## 2022-02-22 PROCEDURE — 85730 THROMBOPLASTIN TIME PARTIAL: CPT

## 2022-02-22 PROCEDURE — 85610 PROTHROMBIN TIME: CPT

## 2022-02-22 PROCEDURE — 86850 RBC ANTIBODY SCREEN: CPT

## 2022-02-22 PROCEDURE — 86901 BLOOD TYPING SEROLOGIC RH(D): CPT

## 2022-02-22 PROCEDURE — 87086 URINE CULTURE/COLONY COUNT: CPT

## 2022-02-22 PROCEDURE — G0463: CPT | Mod: 25

## 2022-02-22 PROCEDURE — 36415 COLL VENOUS BLD VENIPUNCTURE: CPT

## 2022-02-22 NOTE — H&P PST ADULT - HISTORY OF PRESENT ILLNESS
65 y/o male presents to [facility] for scheduled cystoscopy ureteroscopy lithotripsy ureteral stent exchange on 3/2/22. Patient reports recent admission 11/2021 for difficulty urinating associated with right flank pain, diagnostic testing noted with stone and stent placed. He now presents for stent exchange.

## 2022-02-22 NOTE — H&P PST ADULT - NSICDXPASTSURGICALHX_GEN_ALL_CORE_FT
PAST SURGICAL HISTORY:  History of back surgery lumbar    History of lithotripsy with stent placement

## 2022-02-22 NOTE — H&P PST ADULT - PROBLEM SELECTOR PLAN 1
Pre op instructions given and explained.  Avoid NSAIDs and OTC supplements.   Patient verbalized understanding  medical consult requested by surgeon 2/23/22  covid 19 swab scheduled 2/27/22, advised to quarantine after test

## 2022-02-22 NOTE — H&P PST ADULT - ASSESSMENT
65 y/o male presents to [facility] for scheduled cystoscopy ureteroscopy lithotripsy ureteral stent exchange on 3/2/22.

## 2022-02-22 NOTE — H&P PST ADULT - NSICDXPASTMEDICALHX_GEN_ALL_CORE_FT
PAST MEDICAL HISTORY:  DM (diabetes mellitus)     H/O diabetic neuropathy     HTN (hypertension)     Right ureteral stone

## 2022-02-22 NOTE — H&P PST ADULT - NSANTHOSAYNRD_GEN_A_CORE
No. ZOFIA screening performed.  STOP BANG Legend: 0-2 = LOW Risk; 3-4 = INTERMEDIATE Risk; 5-8 = HIGH Risk

## 2022-02-23 DIAGNOSIS — Z01.818 ENCOUNTER FOR OTHER PREPROCEDURAL EXAMINATION: ICD-10-CM

## 2022-02-23 DIAGNOSIS — N20.1 CALCULUS OF URETER: ICD-10-CM

## 2022-02-23 LAB
CULTURE RESULTS: SIGNIFICANT CHANGE UP
SPECIMEN SOURCE: SIGNIFICANT CHANGE UP

## 2022-03-01 RX ORDER — ONDANSETRON 8 MG/1
4 TABLET, FILM COATED ORAL ONCE
Refills: 0 | Status: DISCONTINUED | OUTPATIENT
Start: 2022-03-02 | End: 2022-03-02

## 2022-03-01 RX ORDER — FENTANYL CITRATE 50 UG/ML
50 INJECTION INTRAVENOUS
Refills: 0 | Status: DISCONTINUED | OUTPATIENT
Start: 2022-03-02 | End: 2022-03-02

## 2022-03-01 RX ORDER — OXYCODONE HYDROCHLORIDE 5 MG/1
5 TABLET ORAL ONCE
Refills: 0 | Status: DISCONTINUED | OUTPATIENT
Start: 2022-03-02 | End: 2022-03-02

## 2022-03-01 RX ORDER — SODIUM CHLORIDE 9 MG/ML
1000 INJECTION, SOLUTION INTRAVENOUS
Refills: 0 | Status: DISCONTINUED | OUTPATIENT
Start: 2022-03-02 | End: 2022-03-02

## 2022-03-02 ENCOUNTER — OUTPATIENT (OUTPATIENT)
Dept: INPATIENT UNIT | Facility: HOSPITAL | Age: 67
LOS: 1 days | Discharge: HOME | End: 2022-03-02
Payer: MEDICARE

## 2022-03-02 ENCOUNTER — APPOINTMENT (OUTPATIENT)
Dept: UROLOGY | Facility: HOSPITAL | Age: 67
End: 2022-03-02

## 2022-03-02 VITALS
SYSTOLIC BLOOD PRESSURE: 158 MMHG | HEIGHT: 68 IN | RESPIRATION RATE: 18 BRPM | TEMPERATURE: 98 F | OXYGEN SATURATION: 100 % | HEART RATE: 89 BPM | WEIGHT: 160.06 LBS | DIASTOLIC BLOOD PRESSURE: 99 MMHG

## 2022-03-02 VITALS
HEART RATE: 72 BPM | DIASTOLIC BLOOD PRESSURE: 84 MMHG | RESPIRATION RATE: 16 BRPM | OXYGEN SATURATION: 100 % | SYSTOLIC BLOOD PRESSURE: 150 MMHG

## 2022-03-02 DIAGNOSIS — Z98.890 OTHER SPECIFIED POSTPROCEDURAL STATES: Chronic | ICD-10-CM

## 2022-03-02 DIAGNOSIS — N20.1 CALCULUS OF URETER: ICD-10-CM

## 2022-03-02 PROCEDURE — 76000 FLUOROSCOPY <1 HR PHYS/QHP: CPT

## 2022-03-02 PROCEDURE — 52351 CYSTOURETERO & OR PYELOSCOPE: CPT | Mod: LT

## 2022-03-02 PROCEDURE — 74420 UROGRAPHY RTRGR +-KUB: CPT | Mod: 26,LT

## 2022-03-02 PROCEDURE — C1769: CPT

## 2022-03-02 PROCEDURE — C1889: CPT

## 2022-03-02 PROCEDURE — 74420 UROGRAPHY RTRGR +-KUB: CPT

## 2022-03-02 RX ORDER — TRAMADOL HYDROCHLORIDE 50 MG/1
1 TABLET ORAL
Qty: 12 | Refills: 0
Start: 2022-03-02 | End: 2022-03-04

## 2022-03-02 RX ORDER — METFORMIN HYDROCHLORIDE 850 MG/1
2 TABLET ORAL
Qty: 0 | Refills: 0 | DISCHARGE

## 2022-03-02 RX ORDER — OXYBUTYNIN CHLORIDE 5 MG
1 TABLET ORAL
Qty: 15 | Refills: 0
Start: 2022-03-02 | End: 2022-03-06

## 2022-03-02 RX ORDER — TAMSULOSIN HYDROCHLORIDE 0.4 MG/1
1 CAPSULE ORAL
Qty: 0 | Refills: 0 | DISCHARGE

## 2022-03-02 RX ORDER — PHENAZOPYRIDINE HCL 100 MG
1 TABLET ORAL
Qty: 9 | Refills: 0
Start: 2022-03-02 | End: 2022-03-04

## 2022-03-02 RX ORDER — PHENAZOPYRIDINE HCL 100 MG
200 TABLET ORAL ONCE
Refills: 0 | Status: COMPLETED | OUTPATIENT
Start: 2022-03-02 | End: 2022-03-02

## 2022-03-02 RX ORDER — DILTIAZEM HCL 120 MG
1 CAPSULE, EXT RELEASE 24 HR ORAL
Qty: 0 | Refills: 0 | DISCHARGE

## 2022-03-02 RX ORDER — CIPROFLOXACIN LACTATE 400MG/40ML
1 VIAL (ML) INTRAVENOUS
Qty: 10 | Refills: 0
Start: 2022-03-02 | End: 2022-03-06

## 2022-03-02 RX ORDER — OXYBUTYNIN CHLORIDE 5 MG
5 TABLET ORAL ONCE
Refills: 0 | Status: COMPLETED | OUTPATIENT
Start: 2022-03-02 | End: 2022-03-02

## 2022-03-02 RX ADMIN — SODIUM CHLORIDE 125 MILLILITER(S): 9 INJECTION, SOLUTION INTRAVENOUS at 08:47

## 2022-03-02 RX ADMIN — Medication 5 MILLIGRAM(S): at 09:02

## 2022-03-02 RX ADMIN — Medication 200 MILLIGRAM(S): at 09:03

## 2022-03-02 NOTE — ASU DISCHARGE PLAN (ADULT/PEDIATRIC) - NURSING INSTRUCTIONS
For any problems or concerns,contact your doctor. [facility] patients should call the [facility]. If you cannot reach the doctor or clinic, call [facility] Emergency Department at [phone] or go to your local Emergency Department.  A responsible adult should be with you for the rest of the day and night for your safety and to help you if you needed. Resume your medications as listed on the attached Medication Record. Begin with liquids and light food ( tea, toast, Jello, soups). Advance to what you normally eat. Liquids should taken in adequate amounts today.     CALL the DOCTOR:    -Fever greater than  101F  - Signs  of infection such as : increase pain,swelling,redness,or a bad  smell coming from the wound.  -Excessive amount of bleeding.  - Any pain that appears to be getting worse.  - Vomiting  -  If you have  not urinated 8 hours after surgery or have any difficulty urinating.     A responsible adult should be with you for the rest of the day and night for your safety and to help you if you needed.    Review attached FACT SHEET if applicable.

## 2022-03-02 NOTE — BRIEF OPERATIVE NOTE - OPERATION/FINDINGS
retrograde pyelogram showed hydronephrosis on RIGHT kidney, but no filling defects. Ureteroscopy showed no stones in ureter or kidney

## 2022-03-02 NOTE — ASU DISCHARGE PLAN (ADULT/PEDIATRIC) - CARE PROVIDER_API CALL
Fidel Kim)  Urology  [address], [address] [place] [address]  Phone: [phone]  Fax: [phone]  Follow Up Time: 1 week

## 2022-03-02 NOTE — ASU PATIENT PROFILE, ADULT - FALL HARM RISK - HARM RISK INTERVENTIONS

## 2022-03-02 NOTE — BRIEF OPERATIVE NOTE - NSICDXBRIEFPROCEDURE_GEN_ALL_CORE_FT
PROCEDURES:  Ureteroscopy 02-Mar-2022 08:28:28  Fidel Kim  Pyelogram, retrograde 02-Mar-2022 08:28:55  Fidel Kim

## 2022-03-02 NOTE — ASU DISCHARGE PLAN (ADULT/PEDIATRIC) - NS MD DC FALL RISK RISK
For information on Fall & Injury Prevention, visit: [email or web address] OR  [email or web address] OR  https://www.cdc.gov/steadi/patient.html

## 2022-03-08 DIAGNOSIS — E11.40 TYPE 2 DIABETES MELLITUS WITH DIABETIC NEUROPATHY, UNSPECIFIED: ICD-10-CM

## 2022-03-08 DIAGNOSIS — N20.1 CALCULUS OF URETER: ICD-10-CM

## 2022-03-08 DIAGNOSIS — I10 ESSENTIAL (PRIMARY) HYPERTENSION: ICD-10-CM

## 2022-03-08 DIAGNOSIS — N13.30 UNSPECIFIED HYDRONEPHROSIS: ICD-10-CM

## 2022-03-08 DIAGNOSIS — Z79.84 LONG TERM (CURRENT) USE OF ORAL HYPOGLYCEMIC DRUGS: ICD-10-CM

## 2022-03-10 ENCOUNTER — APPOINTMENT (OUTPATIENT)
Dept: UROLOGY | Facility: CLINIC | Age: 67
End: 2022-03-10
Payer: MEDICARE

## 2022-03-10 DIAGNOSIS — N20.1 CALCULUS OF URETER: ICD-10-CM

## 2022-03-10 PROCEDURE — 99213 OFFICE O/P EST LOW 20 MIN: CPT

## 2022-03-10 NOTE — HISTORY OF PRESENT ILLNESS
[FreeTextEntry1] : 66 year old man seen 12/16/2021 for follow up. He is s/p RIGHT ureteral stent placement for 2 mm RIGHT UVJ stone. He reports some flank pain with voiding, but improved since stent placement. Some hematuria. Otherwise tolerating stent well. \par \par 03/10/2022: Patient presents for follow up. He is s/p RIGHT ureteroscopy, which was neg for stone. He reports some mild flank pain and dysuria, but now resolved. No acute  complaints.

## 2022-03-10 NOTE — ASSESSMENT
[FreeTextEntry1] : 65 yo M with RIGHT ureteral stone s/p negative RIGHT ureteroscopy. Patient is doing well post operatively, no complications. We discussed obtaining obtaining renal sonogram in 6 weeks to evaluate for residual stone burden or persistent hydronephrosis which could indicate distal stones or possible ureteral stricture. We discussed KUB xray to rule out stones in the ureters. patient will be instructed to perform 24 hour urine collection to assay urine electrolytes. This would allow us to discuss lifestyle, dietary, or pharmacologic interventions to reduce stone recurrence risk. For now, patient was instructed to increase fluid intake to a urine output of 2L daily, to add lemon juice to the water as a source of citrate, and to reduce sodium intake. All of the patient's questions and concerns addressed. \par - RBUS, KUB in 6 weeks\par - 24 hour urine collection\par - RTC 6 weeks

## 2022-03-24 ENCOUNTER — TRANSCRIPTION ENCOUNTER (OUTPATIENT)
Age: 67
End: 2022-03-24

## 2022-04-02 NOTE — H&P PST ADULT - NS PRO PASSIVE SMOKE EXP
No
You can access the FollowMyHealth Patient Portal offered by [facility] by registering at the following website: [email or web address]. By joining [email or web address]’s FollowMyHealth portal, you will also be able to view your health information using other applications (apps) compatible with our system.

## 2022-05-06 PROBLEM — N20.1 CALCULUS OF URETER: Chronic | Status: ACTIVE | Noted: 2022-02-22

## 2022-05-06 PROBLEM — Z86.39 PERSONAL HISTORY OF OTHER ENDOCRINE, NUTRITIONAL AND METABOLIC DISEASE: Chronic | Status: ACTIVE | Noted: 2022-02-22

## 2022-05-10 ENCOUNTER — APPOINTMENT (OUTPATIENT)
Dept: UROLOGY | Facility: CLINIC | Age: 67
End: 2022-05-10
Payer: MEDICARE

## 2022-05-10 VITALS
OXYGEN SATURATION: 98 % | HEART RATE: 80 BPM | HEIGHT: 68 IN | BODY MASS INDEX: 24.25 KG/M2 | SYSTOLIC BLOOD PRESSURE: 155 MMHG | DIASTOLIC BLOOD PRESSURE: 89 MMHG | WEIGHT: 160 LBS

## 2022-05-10 DIAGNOSIS — N50.82 SCROTAL PAIN: ICD-10-CM

## 2022-05-10 PROCEDURE — 99213 OFFICE O/P EST LOW 20 MIN: CPT

## 2022-05-24 ENCOUNTER — APPOINTMENT (OUTPATIENT)
Dept: ULTRASOUND IMAGING | Facility: CLINIC | Age: 67
End: 2022-05-24
Payer: MEDICARE

## 2022-05-24 ENCOUNTER — OUTPATIENT (OUTPATIENT)
Dept: OUTPATIENT SERVICES | Facility: HOSPITAL | Age: 67
LOS: 1 days | End: 2022-05-24
Payer: MEDICARE

## 2022-05-24 ENCOUNTER — APPOINTMENT (OUTPATIENT)
Dept: RADIOLOGY | Facility: CLINIC | Age: 67
End: 2022-05-24
Payer: MEDICARE

## 2022-05-24 DIAGNOSIS — Z98.890 OTHER SPECIFIED POSTPROCEDURAL STATES: Chronic | ICD-10-CM

## 2022-05-24 DIAGNOSIS — N20.1 CALCULUS OF URETER: ICD-10-CM

## 2022-05-24 PROCEDURE — 76775 US EXAM ABDO BACK WALL LIM: CPT

## 2022-05-24 PROCEDURE — 74018 RADEX ABDOMEN 1 VIEW: CPT | Mod: 26

## 2022-05-24 PROCEDURE — 74018 RADEX ABDOMEN 1 VIEW: CPT

## 2022-05-24 PROCEDURE — 76775 US EXAM ABDO BACK WALL LIM: CPT | Mod: 26

## 2022-06-01 ENCOUNTER — NON-APPOINTMENT (OUTPATIENT)
Age: 67
End: 2022-06-01

## 2022-06-10 NOTE — ASSESSMENT
[FreeTextEntry1] : 65 yo M with lower abd pain and scrotal pain. Discussed this may be intrascrotal process but given his history of stones this could represent renal colic. Recommend RBUS, KUB and scrotal US.

## 2022-06-10 NOTE — HISTORY OF PRESENT ILLNESS
[FreeTextEntry1] : 66 year old man seen 12/16/2021 for follow up. He is s/p RIGHT ureteral stent placement for 2 mm RIGHT UVJ stone. He reports some flank pain with voiding, but improved since stent placement. Some hematuria. Otherwise tolerating stent well. \par \par 03/10/2022: Patient presents for follow up. He is s/p RIGHT ureteroscopy, which was neg for stone. He reports some mild flank pain and dysuria, but now resolved. No acute  complaints.\par \par 05/10/2022: Patient presents for follow up. He reports abd pain radiating to groin. No new LUTS or other complaints.

## 2023-11-10 ENCOUNTER — NON-APPOINTMENT (OUTPATIENT)
Age: 68
End: 2023-11-10

## 2023-11-14 ENCOUNTER — APPOINTMENT (OUTPATIENT)
Dept: NEUROLOGY | Facility: CLINIC | Age: 68
End: 2023-11-14
Payer: MEDICARE

## 2023-11-14 VITALS
WEIGHT: 160 LBS | HEIGHT: 68 IN | TEMPERATURE: 98.2 F | BODY MASS INDEX: 24.25 KG/M2 | SYSTOLIC BLOOD PRESSURE: 134 MMHG | HEART RATE: 87 BPM | DIASTOLIC BLOOD PRESSURE: 81 MMHG

## 2023-11-14 DIAGNOSIS — M54.16 RADICULOPATHY, LUMBAR REGION: ICD-10-CM

## 2023-11-14 DIAGNOSIS — R20.8 OTHER DISTURBANCES OF SKIN SENSATION: ICD-10-CM

## 2023-11-14 DIAGNOSIS — Z87.39 PERSONAL HISTORY OF OTHER DISEASES OF THE MUSCULOSKELETAL SYSTEM AND CONNECTIVE TISSUE: ICD-10-CM

## 2023-11-14 PROCEDURE — 99214 OFFICE O/P EST MOD 30 MIN: CPT

## 2023-11-14 RX ORDER — POTASSIUM CITRATE 10 MEQ/1
10 MEQ TABLET, EXTENDED RELEASE ORAL TWICE DAILY
Qty: 180 | Refills: 0 | Status: DISCONTINUED | COMMUNITY
Start: 2022-05-10 | End: 2023-11-14

## 2023-11-14 RX ORDER — HYDROCHLOROTHIAZIDE 12.5 MG/1
12.5 TABLET ORAL DAILY
Refills: 0 | Status: ACTIVE | COMMUNITY

## 2023-11-14 RX ORDER — GABAPENTIN 300 MG/1
300 CAPSULE ORAL
Qty: 60 | Refills: 3 | Status: DISCONTINUED | COMMUNITY
Start: 2020-11-19 | End: 2023-11-14

## 2023-11-14 RX ORDER — NORTRIPTYLINE HYDROCHLORIDE 10 MG/1
10 CAPSULE ORAL
Qty: 60 | Refills: 5 | Status: ACTIVE | COMMUNITY
Start: 2023-11-14 | End: 1900-01-01

## 2023-11-14 RX ORDER — METFORMIN HYDROCHLORIDE 500 MG/1
500 TABLET, COATED ORAL TWICE DAILY
Refills: 0 | Status: ACTIVE | COMMUNITY

## 2023-11-14 RX ORDER — LOSARTAN POTASSIUM 50 MG/1
50 TABLET, FILM COATED ORAL DAILY
Refills: 0 | Status: ACTIVE | COMMUNITY

## 2023-12-20 NOTE — CONSULT NOTE ADULT - REASON FOR ADMISSION
RIGHT ureteral stone
increased urinary frequency and R flank pain that started last night
20-Sep-2023

## 2024-02-23 ENCOUNTER — APPOINTMENT (OUTPATIENT)
Dept: NEUROLOGY | Facility: CLINIC | Age: 69
End: 2024-02-23

## 2024-09-12 NOTE — ASU PATIENT PROFILE, ADULT - SURGICAL SITE INCISION
Pt called back and stated that pt does not need u/s and f/u. Pt already had both done at another doctor's office.   
no

## 2024-11-07 NOTE — CDI QUERY NOTE - NSCDI_DOCCLARIFY2_GEN_ALL_CORE_FT
Documentation clarification is required for accuracy in coding and billing, claim validation and reporting severity of illness, quality data and risk of mortality.
[facility]

## 2025-02-07 NOTE — PROVIDER CONTACT NOTE (CRITICAL VALUE NOTIFICATION) - NAME OF MD/NP/PA/DO NOTIFIED:
Nicolette STONER
Valerie
This was a shared visit with the ANDREIA. I reviewed and verified the documentation.